# Patient Record
Sex: FEMALE | Race: WHITE | Employment: FULL TIME | URBAN - METROPOLITAN AREA
[De-identification: names, ages, dates, MRNs, and addresses within clinical notes are randomized per-mention and may not be internally consistent; named-entity substitution may affect disease eponyms.]

---

## 2017-07-10 ENCOUNTER — HOSPITAL ENCOUNTER (OUTPATIENT)
Dept: MAMMOGRAPHY | Age: 42
Discharge: HOME | End: 2017-07-10
Attending: FAMILY MEDICINE | Admitting: FAMILY MEDICINE

## 2017-07-10 DIAGNOSIS — Z12.31 VISIT FOR SCREENING MAMMOGRAM: ICD-10-CM

## 2018-08-02 ENCOUNTER — HOSPITAL ENCOUNTER (OUTPATIENT)
Dept: MAMMOGRAPHY | Age: 43
Discharge: HOME | End: 2018-08-02
Payer: COMMERCIAL

## 2018-08-02 ENCOUNTER — HOSPITAL ENCOUNTER (OUTPATIENT)
Dept: ULTRASOUND IMAGING | Age: 43
Discharge: HOME | End: 2018-08-02
Payer: COMMERCIAL

## 2018-08-02 DIAGNOSIS — R92.8 ABNORMAL MAMMOGRAM: ICD-10-CM

## 2018-08-02 DIAGNOSIS — Z12.31 VISIT FOR SCREENING MAMMOGRAM: ICD-10-CM

## 2018-08-02 PROCEDURE — 76642 ULTRASOUND BREAST LIMITED: CPT

## 2018-08-02 PROCEDURE — 77063 BREAST TOMOSYNTHESIS BI: CPT

## 2019-02-22 ENCOUNTER — HOSPITAL ENCOUNTER (OUTPATIENT)
Dept: MAMMOGRAPHY | Age: 44
Discharge: HOME | End: 2019-02-22
Payer: COMMERCIAL

## 2019-02-22 DIAGNOSIS — R92.8 ABNORMAL MAMMOGRAM: ICD-10-CM

## 2019-02-22 PROCEDURE — G0279 TOMOSYNTHESIS, MAMMO: HCPCS

## 2019-08-13 ENCOUNTER — HOSPITAL ENCOUNTER (OUTPATIENT)
Dept: MAMMOGRAPHY | Age: 44
Discharge: HOME | End: 2019-08-13
Payer: COMMERCIAL

## 2019-08-13 DIAGNOSIS — Z12.31 VISIT FOR SCREENING MAMMOGRAM: ICD-10-CM

## 2019-08-13 PROCEDURE — 77063 BREAST TOMOSYNTHESIS BI: CPT

## 2020-10-09 ENCOUNTER — HOSPITAL ENCOUNTER (OUTPATIENT)
Dept: MAMMOGRAPHY | Age: 45
Discharge: HOME | End: 2020-10-09
Payer: COMMERCIAL

## 2020-10-09 PROCEDURE — 77063 BREAST TOMOSYNTHESIS BI: CPT

## 2021-10-15 ENCOUNTER — HOSPITAL ENCOUNTER (OUTPATIENT)
Dept: MAMMOGRAPHY | Age: 46
Discharge: HOME | End: 2021-10-15
Payer: COMMERCIAL

## 2021-10-15 VITALS — WEIGHT: 155 LBS | BODY MASS INDEX: 24.91 KG/M2 | HEIGHT: 66 IN

## 2021-10-15 DIAGNOSIS — Z12.31 VISIT FOR SCREENING MAMMOGRAM: ICD-10-CM

## 2021-10-15 PROCEDURE — 77063 BREAST TOMOSYNTHESIS BI: CPT

## 2021-10-27 ENCOUNTER — HOSPITAL ENCOUNTER (OUTPATIENT)
Dept: MAMMOGRAPHY | Age: 46
Discharge: HOME | End: 2021-10-27
Payer: COMMERCIAL

## 2021-10-27 ENCOUNTER — HOSPITAL ENCOUNTER (OUTPATIENT)
Dept: ULTRASOUND IMAGING | Age: 46
Discharge: HOME | End: 2021-10-27
Payer: COMMERCIAL

## 2021-10-27 VITALS — HEIGHT: 66 IN | BODY MASS INDEX: 24.91 KG/M2 | WEIGHT: 155 LBS

## 2021-10-27 DIAGNOSIS — R92.8 ABNORMAL MAMMOGRAM: ICD-10-CM

## 2021-10-27 DIAGNOSIS — R92.8 FOLLOW-UP EXAMINATION OF ABNORMAL MAMMOGRAM: ICD-10-CM

## 2021-10-27 PROCEDURE — 76642 ULTRASOUND BREAST LIMITED: CPT

## 2021-10-27 PROCEDURE — 77065 DX MAMMO INCL CAD UNI: CPT

## 2022-02-07 DIAGNOSIS — R92.8 ABNORMAL MAMMOGRAM: Primary | ICD-10-CM

## 2022-03-22 ENCOUNTER — TELEPHONE (OUTPATIENT)
Dept: PRIMARY CARE CLINIC | Age: 47
End: 2022-03-22

## 2022-03-22 NOTE — TELEPHONE ENCOUNTER
----- Message from Lana Ornelas sent at 3/21/2022  4:19 PM EDT -----  Subject: Message to Provider    QUESTIONS  Information for Provider? Patient would like to reestablish care with the   doctor. Patient would like to have yearly exam if so. Patient was last   seen 4/16/2012 please call to advise.  ---------------------------------------------------------------------------  --------------  CALL BACK INFO  What is the best way for the office to contact you? OK to leave message on   voicemail  Preferred Call Back Phone Number? [phone]  ---------------------------------------------------------------------------  --------------  SCRIPT ANSWERS  Relationship to Patient?  Self

## 2022-03-23 ASSESSMENT — ENCOUNTER SYMPTOMS
NAUSEA: 0
ABDOMINAL PAIN: 0
COUGH: 0
VOMITING: 0
CONSTIPATION: 0
WHEEZING: 0
CHEST TIGHTNESS: 0
DIARRHEA: 0
SHORTNESS OF BREATH: 0

## 2022-03-24 ENCOUNTER — OFFICE VISIT (OUTPATIENT)
Dept: FAMILY MEDICINE CLINIC | Age: 47
End: 2022-03-24
Payer: COMMERCIAL

## 2022-03-24 VITALS
OXYGEN SATURATION: 99 % | WEIGHT: 162 LBS | TEMPERATURE: 98 F | SYSTOLIC BLOOD PRESSURE: 144 MMHG | BODY MASS INDEX: 26.15 KG/M2 | DIASTOLIC BLOOD PRESSURE: 90 MMHG | HEART RATE: 90 BPM

## 2022-03-24 DIAGNOSIS — Z11.59 NEED FOR HEPATITIS C SCREENING TEST: ICD-10-CM

## 2022-03-24 DIAGNOSIS — Z11.4 SCREENING FOR HIV (HUMAN IMMUNODEFICIENCY VIRUS): ICD-10-CM

## 2022-03-24 DIAGNOSIS — Z13.220 SCREENING FOR CHOLESTEROL LEVEL: ICD-10-CM

## 2022-03-24 DIAGNOSIS — R23.3 PETECHIAL RASH: ICD-10-CM

## 2022-03-24 DIAGNOSIS — Z76.89 ENCOUNTER TO ESTABLISH CARE: ICD-10-CM

## 2022-03-24 DIAGNOSIS — R23.3 BRUISES EASILY: ICD-10-CM

## 2022-03-24 DIAGNOSIS — Z13.29 SCREENING FOR THYROID DISORDER: ICD-10-CM

## 2022-03-24 DIAGNOSIS — R23.3 PETECHIAL RASH: Primary | ICD-10-CM

## 2022-03-24 LAB
A/G RATIO: 1.9 (ref 1.1–2.2)
ALBUMIN SERPL-MCNC: 4.6 G/DL (ref 3.4–5)
ALP BLD-CCNC: 121 U/L (ref 40–129)
ALT SERPL-CCNC: 37 U/L (ref 10–40)
ANION GAP SERPL CALCULATED.3IONS-SCNC: 11 MMOL/L (ref 3–16)
AST SERPL-CCNC: 33 U/L (ref 15–37)
BILIRUB SERPL-MCNC: 0.6 MG/DL (ref 0–1)
BUN BLDV-MCNC: 9 MG/DL (ref 7–20)
C-REACTIVE PROTEIN: 4.9 MG/L (ref 0–5.1)
CALCIUM SERPL-MCNC: 9.6 MG/DL (ref 8.3–10.6)
CHLORIDE BLD-SCNC: 102 MMOL/L (ref 99–110)
CHOLESTEROL, TOTAL: 231 MG/DL (ref 0–199)
CO2: 26 MMOL/L (ref 21–32)
CREAT SERPL-MCNC: 0.6 MG/DL (ref 0.6–1.1)
GFR AFRICAN AMERICAN: >60
GFR NON-AFRICAN AMERICAN: >60
GLUCOSE BLD-MCNC: 105 MG/DL (ref 70–99)
HDLC SERPL-MCNC: 94 MG/DL (ref 40–60)
HEPATITIS C ANTIBODY INTERPRETATION: NORMAL
INR BLD: 0.96 (ref 0.88–1.12)
LDL CHOLESTEROL CALCULATED: 125 MG/DL
POTASSIUM SERPL-SCNC: 3.6 MMOL/L (ref 3.5–5.1)
PROTHROMBIN TIME: 10.8 SEC (ref 9.9–12.7)
REASON FOR REJECTION: NORMAL
REJECTED TEST: NORMAL
RHEUMATOID FACTOR: <10 IU/ML
SODIUM BLD-SCNC: 139 MMOL/L (ref 136–145)
TOTAL PROTEIN: 7 G/DL (ref 6.4–8.2)
TRIGL SERPL-MCNC: 59 MG/DL (ref 0–150)
TSH REFLEX: 0.9 UIU/ML (ref 0.27–4.2)
VLDLC SERPL CALC-MCNC: 12 MG/DL

## 2022-03-24 PROCEDURE — G8419 CALC BMI OUT NRM PARAM NOF/U: HCPCS | Performed by: CLINICAL NURSE SPECIALIST

## 2022-03-24 PROCEDURE — G8427 DOCREV CUR MEDS BY ELIG CLIN: HCPCS | Performed by: CLINICAL NURSE SPECIALIST

## 2022-03-24 PROCEDURE — G8484 FLU IMMUNIZE NO ADMIN: HCPCS | Performed by: CLINICAL NURSE SPECIALIST

## 2022-03-24 PROCEDURE — 1036F TOBACCO NON-USER: CPT | Performed by: CLINICAL NURSE SPECIALIST

## 2022-03-24 PROCEDURE — 99203 OFFICE O/P NEW LOW 30 MIN: CPT | Performed by: CLINICAL NURSE SPECIALIST

## 2022-03-24 RX ORDER — FOLIC ACID 0.8 MG
TABLET ORAL
COMMUNITY

## 2022-03-24 RX ORDER — ASCORBIC ACID 500 MG
500 TABLET ORAL DAILY
COMMUNITY

## 2022-03-24 RX ORDER — CYANOCOBALAMIN (VITAMIN B-12) 1000 MCG
TABLET ORAL
COMMUNITY

## 2022-03-24 RX ORDER — BIOTIN 5 MG
TABLET ORAL
COMMUNITY

## 2022-03-24 RX ORDER — NORETHINDRONE ACETATE AND ETHINYL ESTRADIOL, ETHINYL ESTRADIOL AND FERROUS FUMARATE 1MG-10(24)
KIT ORAL
COMMUNITY
Start: 2022-02-26

## 2022-03-24 ASSESSMENT — PATIENT HEALTH QUESTIONNAIRE - PHQ9
1. LITTLE INTEREST OR PLEASURE IN DOING THINGS: 0
SUM OF ALL RESPONSES TO PHQ QUESTIONS 1-9: 0
2. FEELING DOWN, DEPRESSED OR HOPELESS: 0
SUM OF ALL RESPONSES TO PHQ9 QUESTIONS 1 & 2: 0
SUM OF ALL RESPONSES TO PHQ QUESTIONS 1-9: 0

## 2022-03-24 ASSESSMENT — ENCOUNTER SYMPTOMS
VISUAL CHANGE: 0
SORE THROAT: 0
CHANGE IN BOWEL HABIT: 0
SWOLLEN GLANDS: 0

## 2022-03-24 NOTE — PROGRESS NOTES
SUBJECTIVE:    Patient ID:  Ric North is a 50 y.o. female      Patient is here to establish care and for rash and bruising easily for about a month. States she has always bruised easily, but it has gotten worse over the last month. States she had COVID-19 mid January. She has not been vaccinated for COVID-19. She has been taking the listed vitamins for over a year, but added the vitamin K about 2 weeks ago. Patient's allergies, medication, medical, surgical, family and social history were reviewed and updated accordingly. Other  This is a new problem. The current episode started 1 to 4 weeks ago. The problem occurs intermittently. The problem has been waxing and waning. Associated symptoms include a rash. Pertinent negatives include no abdominal pain, anorexia, arthralgias, change in bowel habit, chest pain, chills, congestion, coughing, diaphoresis, fatigue, fever, headaches, joint swelling, myalgias, nausea, numbness, sore throat, swollen glands, urinary symptoms, vertigo, visual change, vomiting or weakness. Nothing aggravates the symptoms. She has tried nothing for the symptoms. Current Outpatient Medications on File Prior to Visit   Medication Sig Dispense Refill    Cyanocobalamin (B-12) 1000 MCG TABS Take by mouth      QUERCETIN PO Take 750 mg by mouth      Magnesium 500 MG CAPS Take by mouth      vitamin C (ASCORBIC ACID) 500 MG tablet Take 500 mg by mouth daily      Vitamin D-Vitamin K (K2 PLUS D3 PO) Take 5,000 Units by mouth      zinc 50 MG CAPS Take by mouth every other day      VITAMIN K PO Take 550 mcg by mouth daily      selenium 50 MCG tablet Take 50 mcg by mouth daily      Krill Oil 1000 MG CAPS Take by mouth      LO LOESTRIN FE 1 MG-10 MCG / 10 MCG tablet TAKE 1 TABLET BY MOUTH DAILY. TAKE PILLS IN THE ORDER DIRECTED ON BLISTER PACK. (Patient not taking: Reported on 3/24/2022)       No current facility-administered medications on file prior to visit. Past Medical History:   Diagnosis Date    Pelvic mass      Past Surgical History:   Procedure Laterality Date    OTHER SURGICAL HISTORY  04/23/2012    SINGLE SITE LAPAROSCOPIC MYOMECTOMY      UTERINE FIBROID SURGERY  2012     Family History   Problem Relation Age of Onset    Cancer Mother         renal cell ca, (d) in 80 47     Diabetes Father     Heart Failure Father     Hypertension Father     High Cholesterol Father     Prostate Cancer Father     Kidney Disease Father     Heart Attack Maternal Grandmother     Heart Disease Maternal Grandfather     Heart Disease Paternal Grandmother     Diabetes Paternal Grandmother     Heart Attack Paternal Grandfather     Heart Disease Paternal Grandfather      Social History     Socioeconomic History    Marital status:      Spouse name: Not on file    Number of children: Not on file    Years of education: Not on file    Highest education level: Not on file   Occupational History    Not on file   Tobacco Use    Smoking status: Former Smoker     Types: Cigarettes    Smokeless tobacco: Never Used    Tobacco comment: Pt smoked in college socially   Vaping Use    Vaping Use: Never used   Substance and Sexual Activity    Alcohol use: Yes     Alcohol/week: 1.0 standard drink     Types: 1 Glasses of wine per week    Drug use: Never    Sexual activity: Not on file   Other Topics Concern    Not on file   Social History Narrative    ** Merged History Encounter **          Social Determinants of Health     Financial Resource Strain:     Difficulty of Paying Living Expenses: Not on file   Food Insecurity:     Worried About Running Out of Food in the Last Year: Not on file    Asuncion of Food in the Last Year: Not on file   Transportation Needs:     Lack of Transportation (Medical): Not on file    Lack of Transportation (Non-Medical):  Not on file   Physical Activity:     Days of Exercise per Week: Not on file    Minutes of Exercise per Session: Not on file   Stress:     Feeling of Stress : Not on file   Social Connections:     Frequency of Communication with Friends and Family: Not on file    Frequency of Social Gatherings with Friends and Family: Not on file    Attends [employer] Services: Not on file    Active Member of Clubs or Organizations: Not on file    Attends Club or Organization Meetings: Not on file    Marital Status: Not on file   Intimate Partner Violence:     Fear of Current or Ex-Partner: Not on file    Emotionally Abused: Not on file    Physically Abused: Not on file    Sexually Abused: Not on file   Housing Stability:     Unable to Pay for Housing in the Last Year: Not on file    Number of [place] in the Last Year: Not on file    Unstable Housing in the Last Year: Not on file       Review of Systems   Constitutional: Negative for chills, diaphoresis, fatigue, fever and unexpected weight change. HENT: Positive for mouth sores. Negative for congestion and sore throat. Eyes: Negative for visual disturbance. Respiratory: Negative for cough, chest tightness, shortness of breath and wheezing. Cardiovascular: Negative for chest pain, palpitations and leg swelling. Gastrointestinal: Negative for abdominal pain, anorexia, change in bowel habit, constipation, diarrhea, nausea and vomiting. Endocrine: Negative for polydipsia and polyuria. Genitourinary: Negative for hematuria. Musculoskeletal: Negative for arthralgias, joint swelling and myalgias. Skin: Positive for rash. Neurological: Negative for vertigo, weakness, numbness and headaches. Hematological: Bruises/bleeds easily. Psychiatric/Behavioral: Nervous/anxious: slight. OBJECTIVE:    Physical Exam  Vitals and nursing note reviewed. Constitutional:       General: She is not in acute distress. Appearance: Normal appearance. She is well-developed. She is not ill-appearing. HENT:      Head: Normocephalic and atraumatic.       Right Ear: External Pt voiced understanding. Call office if experience side effects from medications. Please note that some or all of this record was generated using voice recognition software. If there are any questions about the content of this document, please contact the author as some errors in transcription may have occurred.

## 2022-03-25 ENCOUNTER — TELEPHONE (OUTPATIENT)
Dept: FAMILY MEDICINE CLINIC | Age: 47
End: 2022-03-25

## 2022-03-25 DIAGNOSIS — R23.3 PETECHIAL RASH: Primary | ICD-10-CM

## 2022-03-25 DIAGNOSIS — R23.3 BRUISES EASILY: ICD-10-CM

## 2022-03-25 DIAGNOSIS — R73.01 ELEVATED FASTING GLUCOSE: Primary | ICD-10-CM

## 2022-03-25 DIAGNOSIS — R23.3 PETECHIAL RASH: ICD-10-CM

## 2022-03-25 LAB
ANTI-NUCLEAR ANTIBODY (ANA): NEGATIVE
BASOPHILS ABSOLUTE: 0.1 K/UL (ref 0–0.2)
BASOPHILS RELATIVE PERCENT: 0.7 %
EOSINOPHILS ABSOLUTE: 0.2 K/UL (ref 0–0.6)
EOSINOPHILS RELATIVE PERCENT: 2 %
HCT VFR BLD CALC: 37.4 % (ref 36–48)
HEMATOLOGY PATH CONSULT: YES
HEMOGLOBIN: 12.7 G/DL (ref 12–16)
HIV AG/AB: NORMAL
HIV ANTIGEN: NORMAL
HIV-1 ANTIBODY: NORMAL
HIV-2 AB: NORMAL
LYMPHOCYTES ABSOLUTE: 1.4 K/UL (ref 1–5.1)
LYMPHOCYTES RELATIVE PERCENT: 15.9 %
MCH RBC QN AUTO: 31.3 PG (ref 26–34)
MCHC RBC AUTO-ENTMCNC: 34.1 G/DL (ref 31–36)
MCV RBC AUTO: 91.9 FL (ref 80–100)
MONOCYTES ABSOLUTE: 0.6 K/UL (ref 0–1.3)
MONOCYTES RELATIVE PERCENT: 6.5 %
NEUTROPHILS ABSOLUTE: 6.6 K/UL (ref 1.7–7.7)
NEUTROPHILS RELATIVE PERCENT: 74.9 %
PDW BLD-RTO: 14.1 % (ref 12.4–15.4)
PLATELET # BLD: 3 K/UL (ref 135–450)
PLATELET SLIDE REVIEW: ABNORMAL
PMV BLD AUTO: 10 FL (ref 5–10.5)
RBC # BLD: 4.06 M/UL (ref 4–5.2)
SEDIMENTATION RATE, ERYTHROCYTE: 16 MM/HR (ref 0–20)
SLIDE REVIEW: ABNORMAL
WBC # BLD: 8.8 K/UL (ref 4–11)

## 2022-03-25 NOTE — TELEPHONE ENCOUNTER
Mag called from [facility] lab and stated unable to run pt CBC & ESR tube was possibly contaminated. Needs lab reordered.      Lab: [phone]    Best contact for pt: [phone]

## 2022-03-25 NOTE — TELEPHONE ENCOUNTER
Patient notified to have CBC and ESR redrawn. Patient expresses understanding and will go back to the lab.

## 2022-03-26 ENCOUNTER — HOSPITAL ENCOUNTER (INPATIENT)
Age: 47
LOS: 2 days | Discharge: HOME | DRG: 813 | End: 2022-03-28
Attending: STUDENT IN AN ORGANIZED HEALTH CARE EDUCATION/TRAINING PROGRAM | Admitting: FAMILY MEDICINE
Payer: COMMERCIAL

## 2022-03-26 DIAGNOSIS — R73.01 ELEVATED FASTING GLUCOSE: ICD-10-CM

## 2022-03-26 DIAGNOSIS — D69.3 IDIOPATHIC THROMBOCYTOPENIA (HCC): Primary | ICD-10-CM

## 2022-03-26 PROBLEM — D69.59 THROMBOCYTOPENIA DUE TO COVID-19 VIRUS: Status: ACTIVE | Noted: 2022-03-26

## 2022-03-26 PROBLEM — U07.1 THROMBOCYTOPENIA DUE TO COVID-19 VIRUS: Status: ACTIVE | Noted: 2022-03-26

## 2022-03-26 LAB
ANION GAP SERPL CALCULATED.3IONS-SCNC: 13 MMOL/L (ref 3–16)
APTT: 37.4 SEC (ref 26.2–38.6)
BASOPHILS ABSOLUTE: 0.1 K/UL (ref 0–0.2)
BASOPHILS RELATIVE PERCENT: 1 %
BLOOD SMEAR REVIEW: NORMAL
BUN BLDV-MCNC: 10 MG/DL (ref 7–20)
CALCIUM SERPL-MCNC: 10.2 MG/DL (ref 8.3–10.6)
CHLORIDE BLD-SCNC: 99 MMOL/L (ref 99–110)
CO2: 25 MMOL/L (ref 21–32)
CREAT SERPL-MCNC: 0.7 MG/DL (ref 0.6–1.1)
D DIMER: 228 NG/ML DDU (ref 0–229)
EOSINOPHILS ABSOLUTE: 0.1 K/UL (ref 0–0.6)
EOSINOPHILS RELATIVE PERCENT: 1.8 %
ESTIMATED AVERAGE GLUCOSE: 88.2 MG/DL
FIBRINOGEN: 374 MG/DL (ref 200–397)
GFR AFRICAN AMERICAN: >60
GFR NON-AFRICAN AMERICAN: >60
GLUCOSE BLD-MCNC: 92 MG/DL (ref 70–99)
HBA1C MFR BLD: 4.7 %
HCG QUALITATIVE: NEGATIVE
HCT VFR BLD CALC: 39 % (ref 36–48)
HEMATOLOGY PATH CONSULT: YES
HEMOGLOBIN: 13.2 G/DL (ref 12–16)
INR BLD: 1.02 (ref 0.88–1.12)
LACTATE DEHYDROGENASE: 214 U/L (ref 100–190)
LYMPHOCYTES ABSOLUTE: 1.6 K/UL (ref 1–5.1)
LYMPHOCYTES RELATIVE PERCENT: 20.2 %
MCH RBC QN AUTO: 30.8 PG (ref 26–34)
MCHC RBC AUTO-ENTMCNC: 33.8 G/DL (ref 31–36)
MCV RBC AUTO: 91.1 FL (ref 80–100)
MONOCYTES ABSOLUTE: 0.4 K/UL (ref 0–1.3)
MONOCYTES RELATIVE PERCENT: 5.1 %
NEUTROPHILS ABSOLUTE: 5.6 K/UL (ref 1.7–7.7)
NEUTROPHILS RELATIVE PERCENT: 71.9 %
PDW BLD-RTO: 13.6 % (ref 12.4–15.4)
PLATELET # BLD: 3 K/UL (ref 135–450)
PLATELET SLIDE REVIEW: ABNORMAL
PMV BLD AUTO: 9 FL (ref 5–10.5)
POTASSIUM REFLEX MAGNESIUM: 3.8 MMOL/L (ref 3.5–5.1)
PROTHROMBIN TIME: 11.5 SEC (ref 9.9–12.7)
RBC # BLD: 4.28 M/UL (ref 4–5.2)
RBC # BLD: NORMAL 10*6/UL
SLIDE REVIEW: ABNORMAL
SODIUM BLD-SCNC: 137 MMOL/L (ref 136–145)
WBC # BLD: 7.8 K/UL (ref 4–11)

## 2022-03-26 PROCEDURE — 83615 LACTATE (LD) (LDH) ENZYME: CPT

## 2022-03-26 PROCEDURE — 85384 FIBRINOGEN ACTIVITY: CPT

## 2022-03-26 PROCEDURE — 85730 THROMBOPLASTIN TIME PARTIAL: CPT

## 2022-03-26 PROCEDURE — 6370000000 HC RX 637 (ALT 250 FOR IP): Performed by: STUDENT IN AN ORGANIZED HEALTH CARE EDUCATION/TRAINING PROGRAM

## 2022-03-26 PROCEDURE — 99284 EMERGENCY DEPT VISIT MOD MDM: CPT

## 2022-03-26 PROCEDURE — 80048 BASIC METABOLIC PNL TOTAL CA: CPT

## 2022-03-26 PROCEDURE — 2580000003 HC RX 258: Performed by: FAMILY MEDICINE

## 2022-03-26 PROCEDURE — XW133E7 TRANSFUSION OF HYPERIMMUNE GLOBULIN INTO PERIPHERAL VEIN, PERCUTANEOUS APPROACH, NEW TECHNOLOGY GROUP 7: ICD-10-PCS | Performed by: FAMILY MEDICINE

## 2022-03-26 PROCEDURE — 85025 COMPLETE CBC W/AUTO DIFF WBC: CPT

## 2022-03-26 PROCEDURE — 96374 THER/PROPH/DIAG INJ IV PUSH: CPT

## 2022-03-26 PROCEDURE — 84703 CHORIONIC GONADOTROPIN ASSAY: CPT

## 2022-03-26 PROCEDURE — 6360000002 HC RX W HCPCS: Performed by: STUDENT IN AN ORGANIZED HEALTH CARE EDUCATION/TRAINING PROGRAM

## 2022-03-26 PROCEDURE — 85379 FIBRIN DEGRADATION QUANT: CPT

## 2022-03-26 PROCEDURE — 83010 ASSAY OF HAPTOGLOBIN QUANT: CPT

## 2022-03-26 PROCEDURE — 2580000003 HC RX 258: Performed by: STUDENT IN AN ORGANIZED HEALTH CARE EDUCATION/TRAINING PROGRAM

## 2022-03-26 PROCEDURE — 85610 PROTHROMBIN TIME: CPT

## 2022-03-26 PROCEDURE — 1200000000 HC SEMI PRIVATE

## 2022-03-26 PROCEDURE — 36415 COLL VENOUS BLD VENIPUNCTURE: CPT

## 2022-03-26 PROCEDURE — 6360000002 HC RX W HCPCS: Performed by: FAMILY MEDICINE

## 2022-03-26 RX ORDER — HYDRALAZINE HYDROCHLORIDE 20 MG/ML
10 INJECTION INTRAMUSCULAR; INTRAVENOUS ONCE
Status: COMPLETED | OUTPATIENT
Start: 2022-03-26 | End: 2022-03-26

## 2022-03-26 RX ORDER — HYDRALAZINE HYDROCHLORIDE 20 MG/ML
10 INJECTION INTRAMUSCULAR; INTRAVENOUS EVERY 4 HOURS PRN
Status: DISCONTINUED | OUTPATIENT
Start: 2022-03-26 | End: 2022-03-28 | Stop reason: HOSPADM

## 2022-03-26 RX ORDER — ONDANSETRON 4 MG/1
4 TABLET, ORALLY DISINTEGRATING ORAL EVERY 8 HOURS PRN
Status: DISCONTINUED | OUTPATIENT
Start: 2022-03-26 | End: 2022-03-28 | Stop reason: HOSPADM

## 2022-03-26 RX ORDER — POLYETHYLENE GLYCOL 3350 17 G/17G
17 POWDER, FOR SOLUTION ORAL DAILY PRN
Status: DISCONTINUED | OUTPATIENT
Start: 2022-03-26 | End: 2022-03-28 | Stop reason: HOSPADM

## 2022-03-26 RX ORDER — ACETAMINOPHEN 325 MG/1
650 TABLET ORAL ONCE
Status: COMPLETED | OUTPATIENT
Start: 2022-03-26 | End: 2022-03-26

## 2022-03-26 RX ORDER — ACETAMINOPHEN 325 MG/1
650 TABLET ORAL EVERY 6 HOURS PRN
Status: DISCONTINUED | OUTPATIENT
Start: 2022-03-26 | End: 2022-03-28 | Stop reason: HOSPADM

## 2022-03-26 RX ORDER — DIPHENHYDRAMINE HYDROCHLORIDE 50 MG/ML
50 INJECTION INTRAMUSCULAR; INTRAVENOUS ONCE
Status: COMPLETED | OUTPATIENT
Start: 2022-03-26 | End: 2022-03-26

## 2022-03-26 RX ORDER — SODIUM CHLORIDE 9 MG/ML
25 INJECTION, SOLUTION INTRAVENOUS PRN
Status: DISCONTINUED | OUTPATIENT
Start: 2022-03-26 | End: 2022-03-28 | Stop reason: HOSPADM

## 2022-03-26 RX ORDER — ACETAMINOPHEN 650 MG/1
650 SUPPOSITORY RECTAL EVERY 6 HOURS PRN
Status: DISCONTINUED | OUTPATIENT
Start: 2022-03-26 | End: 2022-03-28 | Stop reason: HOSPADM

## 2022-03-26 RX ORDER — ONDANSETRON 2 MG/ML
4 INJECTION INTRAMUSCULAR; INTRAVENOUS EVERY 6 HOURS PRN
Status: DISCONTINUED | OUTPATIENT
Start: 2022-03-26 | End: 2022-03-28 | Stop reason: HOSPADM

## 2022-03-26 RX ORDER — SODIUM CHLORIDE 0.9 % (FLUSH) 0.9 %
5-40 SYRINGE (ML) INJECTION EVERY 12 HOURS SCHEDULED
Status: DISCONTINUED | OUTPATIENT
Start: 2022-03-26 | End: 2022-03-28 | Stop reason: HOSPADM

## 2022-03-26 RX ORDER — SODIUM CHLORIDE 0.9 % (FLUSH) 0.9 %
5-40 SYRINGE (ML) INJECTION PRN
Status: DISCONTINUED | OUTPATIENT
Start: 2022-03-26 | End: 2022-03-28 | Stop reason: HOSPADM

## 2022-03-26 RX ORDER — DEXAMETHASONE SODIUM PHOSPHATE 10 MG/ML
8 INJECTION, SOLUTION INTRAMUSCULAR; INTRAVENOUS 2 TIMES DAILY
Status: DISCONTINUED | OUTPATIENT
Start: 2022-03-27 | End: 2022-03-26 | Stop reason: ALTCHOICE

## 2022-03-26 RX ADMIN — IMMUNE GLOBULIN (HUMAN) 70 G: 10 INJECTION INTRAVENOUS; SUBCUTANEOUS at 20:33

## 2022-03-26 RX ADMIN — SODIUM CHLORIDE, PRESERVATIVE FREE 10 ML: 5 INJECTION INTRAVENOUS at 20:05

## 2022-03-26 RX ADMIN — ACETAMINOPHEN 650 MG: 325 TABLET ORAL at 16:36

## 2022-03-26 RX ADMIN — DIPHENHYDRAMINE HYDROCHLORIDE 50 MG: 50 INJECTION, SOLUTION INTRAMUSCULAR; INTRAVENOUS at 16:40

## 2022-03-26 RX ADMIN — DEXAMETHASONE SODIUM PHOSPHATE 40 MG: 4 INJECTION, SOLUTION INTRAMUSCULAR; INTRAVENOUS at 18:15

## 2022-03-26 RX ADMIN — HYDRALAZINE HYDROCHLORIDE 10 MG: 20 INJECTION, SOLUTION INTRAMUSCULAR; INTRAVENOUS at 20:05

## 2022-03-26 ASSESSMENT — ENCOUNTER SYMPTOMS
ABDOMINAL PAIN: 0
VOMITING: 0

## 2022-03-26 ASSESSMENT — PAIN SCALES - GENERAL
PAINLEVEL_OUTOF10: 0

## 2022-03-26 NOTE — PROGRESS NOTES
4 Eyes Skin Assessment     NAME:  Altagracia Jane  YOB: 1975  MEDICAL RECORD NUMBER:  [id number]    The patient is being assess for  Admission    I agree that 2 RN's have performed a thorough Head to Toe Skin Assessment on the patient. ALL assessment sites listed below have been assessed. Areas assessed by both nurses:    Head, Face, Ears, Shoulders, Back, Chest, Arms, Elbows, Hands, Sacrum. Buttock, Coccyx, Ischium and Legs. Feet and Heels        Does the Patient have a Wound?  No noted wound(s)       Cristofer Prevention initiated:  Yes   Wound Care Orders initiated:  No    Pressure Injury (Stage 3,4, Unstageable, DTI, NWPT, and Complex wounds) if present place consult order under [de-identified] No    New and Established Ostomies if present place consult order under : No      Nurse 1 eSignature: Electronically signed by Jd Solomon RN on 3/26/22 at 7:30 PM EDT    **SHARE this note so that the co-signing nurse is able to place an eSignature**    Nurse 2 eSignature: Electronically signed by Kayla Alcocer RN on 3/26/22 at 7:30 PM EDT

## 2022-03-26 NOTE — ED PROVIDER NOTES
Diagnosis Date    Pelvic mass          SURGICAL HISTORY       Past Surgical History:   Procedure Laterality Date    OTHER SURGICAL HISTORY  04/23/2012    SINGLE SITE LAPAROSCOPIC MYOMECTOMY      UTERINE FIBROID SURGERY  2012         CURRENT MEDICATIONS       Previous Medications    CYANOCOBALAMIN (B-12) 1000 MCG TABS    Take by mouth    KRILL OIL 1000 MG CAPS    Take by mouth    LO LOESTRIN FE 1 MG-10 MCG / 10 MCG TABLET    TAKE 1 TABLET BY MOUTH DAILY. TAKE PILLS IN THE ORDER DIRECTED ON BLISTER PACK. MAGNESIUM 500 MG CAPS    Take by mouth    QUERCETIN PO    Take 750 mg by mouth    SELENIUM 50 MCG TABLET    Take 50 mcg by mouth daily    VITAMIN C (ASCORBIC ACID) 500 MG TABLET    Take 500 mg by mouth daily    VITAMIN D-VITAMIN K (K2 PLUS D3 PO)    Take 5,000 Units by mouth    VITAMIN K PO    Take 550 mcg by mouth daily    ZINC 50 MG CAPS    Take by mouth every other day       ALLERGIES     Patient has no known allergies.     FAMILY HISTORY       Family History   Problem Relation Age of Onset    Cancer Mother         renal cell ca, (d) in 80 54     Diabetes Father     Heart Failure Father     Hypertension Father     High Cholesterol Father     Prostate Cancer Father     Kidney Disease Father     Heart Attack Maternal Grandmother     Heart Disease Maternal Grandfather     Heart Disease Paternal Grandmother     Diabetes Paternal Grandmother     Heart Attack Paternal Grandfather     Heart Disease Paternal Grandfather           SOCIAL HISTORY       Social History     Socioeconomic History    Marital status:      Spouse name: None    Number of children: None    Years of education: None    Highest education level: None   Occupational History    None   Tobacco Use    Smoking status: Former Smoker     Types: Cigarettes    Smokeless tobacco: Never Used    Tobacco comment: Pt smoked in college socially   Vaping Use    Vaping Use: Never used   Substance and Sexual Activity    Alcohol use: Yes     Alcohol/week: 1.0 standard drink     Types: 1 Glasses of wine per week    Drug use: Never    Sexual activity: None   Other Topics Concern    None   Social History Narrative    ** Merged History Encounter **          Social Determinants of Health     Financial Resource Strain:     Difficulty of Paying Living Expenses: Not on file   Food Insecurity:     Worried About Running Out of Food in the Last Year: Not on file    Asuncion of Food in the Last Year: Not on file   Transportation Needs:     Lack of Transportation (Medical): Not on file    Lack of Transportation (Non-Medical): Not on file   Physical Activity:     Days of Exercise per Week: Not on file    Minutes of Exercise per Session: Not on file   Stress:     Feeling of Stress : Not on file   Social Connections:     Frequency of Communication with Friends and Family: Not on file    Frequency of Social Gatherings with Friends and Family: Not on file    Attends Catholic Services: Not on file    Active Member of [address] [place] [address] or Organizations: Not on file    Attends Club or Organization Meetings: Not on file    Marital Status: Not on file   Intimate Partner Violence:     Fear of Current or Ex-Partner: Not on file    Emotionally Abused: Not on file    Physically Abused: Not on file    Sexually Abused: Not on file   Housing Stability:     Unable to Pay for Housing in the Last Year: Not on file    Number of [place] in the Last Year: Not on file    Unstable Housing in the Last Year: Not on file       SCREENINGS                        PHYSICAL EXAM    (up to 7 for level 4, 8 or more for level 5)     ED Triage Vitals [03/26/22 1348]   BP Temp Temp Source Pulse Resp SpO2 Height Weight   (!) 175/83 98.7 °F (37.1 °C) Oral 88 16 99 % 5' 6\" (1.676 m) 158 lb 4.8 oz (71.8 kg)       Physical Exam  Constitutional:       Appearance: Normal appearance. HENT:      Head: Normocephalic and atraumatic.    Eyes:      Conjunctiva/sclera: Conjunctivae normal. Cardiovascular:      Rate and Rhythm: Normal rate and regular rhythm. Pulses: Normal pulses. Heart sounds: Normal heart sounds. Pulmonary:      Effort: Pulmonary effort is normal.      Breath sounds: Normal breath sounds. Abdominal:      General: Abdomen is flat. There is no distension. Palpations: Abdomen is soft. There is no mass. Tenderness: There is no abdominal tenderness. Musculoskeletal:      Cervical back: Normal range of motion. No rigidity. Skin:     General: Skin is warm and dry. Capillary Refill: Capillary refill takes less than 2 seconds. Comments: Nonblanching petechial rash noted to bilateral calfs. 3 cm area of petechial rash noted to the right upper extremity. Multiple bruises noted throughout extremities. Lesions noted on tongue   Neurological:      Mental Status: She is alert and oriented to person, place, and time.    Psychiatric:         Mood and Affect: Mood normal.         Behavior: Behavior normal.         DIAGNOSTIC RESULTS     RADIOLOGY:   Non-plain film images such as CT, Ultrasound and MRI are read by the radiologist. Plain radiographic images are visualized and preliminarily interpreted by the emergency physician with the below findings:      Interpretation per the Radiologist below, if available at the time of this note:    No orders to display         LABS:  Labs Reviewed   CBC WITH AUTO DIFFERENTIAL - Abnormal; Notable for the following components:       Result Value    Platelets 3 (*)     All other components within normal limits    Narrative:     Kelsey Christensen  [facility] tel. [phone],  Hematology results called to and read back by Zakia BOWLING, 03/26/2022  14:59, by JASBIR   LACTATE DEHYDROGENASE - Abnormal; Notable for the following components:     (*)     All other components within normal limits   BASIC METABOLIC PANEL W/ REFLEX TO MG FOR LOW K   D-DIMER, QUANTITATIVE   PROTIME-INR   FIBRINOGEN   APTT   HCG, SERUM, QUALITATIVE Procedures      FINAL IMPRESSION      1. Idiopathic thrombocytopenia (HCC)          DISPOSITION/PLAN   DISPOSITION        PATIENT REFERRED TO:  No follow-up provider specified. DISCHARGE MEDICATIONS:      New Prescriptions    No medications on file       Controlled Substances Monitoring:    If the patient was prescribed a controlled substance today, the PDMP was reviewed as documented below. No flowsheet data found.     (Please note that portions of this note were completed with a voice recognition program.  Efforts were made to edit the dictations but occasionally words are mis-transcribed.)    Sana Moody MD (electronically signed)  Attending Emergency Physician         Ishan Larkin MD  03/26/22 9087

## 2022-03-27 LAB
ANION GAP SERPL CALCULATED.3IONS-SCNC: 15 MMOL/L (ref 3–16)
BASOPHILS ABSOLUTE: 0 K/UL (ref 0–0.2)
BASOPHILS ABSOLUTE: 0 K/UL (ref 0–0.2)
BASOPHILS RELATIVE PERCENT: 0.1 %
BASOPHILS RELATIVE PERCENT: 0.3 %
BUN BLDV-MCNC: 13 MG/DL (ref 7–20)
CALCIUM SERPL-MCNC: 10.2 MG/DL (ref 8.3–10.6)
CHLORIDE BLD-SCNC: 103 MMOL/L (ref 99–110)
CO2: 22 MMOL/L (ref 21–32)
CREAT SERPL-MCNC: 0.6 MG/DL (ref 0.6–1.1)
EOSINOPHILS ABSOLUTE: 0 K/UL (ref 0–0.6)
EOSINOPHILS ABSOLUTE: 0 K/UL (ref 0–0.6)
EOSINOPHILS RELATIVE PERCENT: 0 %
EOSINOPHILS RELATIVE PERCENT: 0 %
GFR AFRICAN AMERICAN: >60
GFR NON-AFRICAN AMERICAN: >60
GLUCOSE BLD-MCNC: 160 MG/DL (ref 70–99)
HAPTOGLOBIN: 129 MG/DL (ref 30–200)
HCT VFR BLD CALC: 37.5 % (ref 36–48)
HCT VFR BLD CALC: 38.2 % (ref 36–48)
HEMOGLOBIN: 12.6 G/DL (ref 12–16)
HEMOGLOBIN: 12.7 G/DL (ref 12–16)
LYMPHOCYTES ABSOLUTE: 0.3 K/UL (ref 1–5.1)
LYMPHOCYTES ABSOLUTE: 0.8 K/UL (ref 1–5.1)
LYMPHOCYTES RELATIVE PERCENT: 4.3 %
LYMPHOCYTES RELATIVE PERCENT: 8 %
MCH RBC QN AUTO: 30.8 PG (ref 26–34)
MCH RBC QN AUTO: 31 PG (ref 26–34)
MCHC RBC AUTO-ENTMCNC: 33.1 G/DL (ref 31–36)
MCHC RBC AUTO-ENTMCNC: 34 G/DL (ref 31–36)
MCV RBC AUTO: 91 FL (ref 80–100)
MCV RBC AUTO: 93.3 FL (ref 80–100)
MONOCYTES ABSOLUTE: 0 K/UL (ref 0–1.3)
MONOCYTES ABSOLUTE: 0.6 K/UL (ref 0–1.3)
MONOCYTES RELATIVE PERCENT: 0.3 %
MONOCYTES RELATIVE PERCENT: 6.3 %
NEUTROPHILS ABSOLUTE: 6.7 K/UL (ref 1.7–7.7)
NEUTROPHILS ABSOLUTE: 8.7 K/UL (ref 1.7–7.7)
NEUTROPHILS RELATIVE PERCENT: 85.4 %
NEUTROPHILS RELATIVE PERCENT: 95.3 %
PDW BLD-RTO: 14 % (ref 12.4–15.4)
PDW BLD-RTO: 14.1 % (ref 12.4–15.4)
PLATELET # BLD: 5 K/UL (ref 135–450)
PLATELET # BLD: 61 K/UL (ref 135–450)
PMV BLD AUTO: 10.3 FL (ref 5–10.5)
PMV BLD AUTO: 10.4 FL (ref 5–10.5)
POTASSIUM SERPL-SCNC: 3.9 MMOL/L (ref 3.5–5.1)
RBC # BLD: 4.1 M/UL (ref 4–5.2)
RBC # BLD: 4.11 M/UL (ref 4–5.2)
SODIUM BLD-SCNC: 140 MMOL/L (ref 136–145)
WBC # BLD: 10.2 K/UL (ref 4–11)
WBC # BLD: 7.1 K/UL (ref 4–11)

## 2022-03-27 PROCEDURE — 36415 COLL VENOUS BLD VENIPUNCTURE: CPT

## 2022-03-27 PROCEDURE — 6360000002 HC RX W HCPCS: Performed by: INTERNAL MEDICINE

## 2022-03-27 PROCEDURE — 80048 BASIC METABOLIC PNL TOTAL CA: CPT

## 2022-03-27 PROCEDURE — 2580000003 HC RX 258: Performed by: INTERNAL MEDICINE

## 2022-03-27 PROCEDURE — 1200000000 HC SEMI PRIVATE

## 2022-03-27 PROCEDURE — 2580000003 HC RX 258: Performed by: FAMILY MEDICINE

## 2022-03-27 PROCEDURE — 85025 COMPLETE CBC W/AUTO DIFF WBC: CPT

## 2022-03-27 RX ADMIN — SODIUM CHLORIDE, PRESERVATIVE FREE 10 ML: 5 INJECTION INTRAVENOUS at 20:32

## 2022-03-27 RX ADMIN — DEXAMETHASONE SODIUM PHOSPHATE 40 MG: 4 INJECTION, SOLUTION INTRAMUSCULAR; INTRAVENOUS at 00:09

## 2022-03-27 ASSESSMENT — PAIN SCALES - GENERAL
PAINLEVEL_OUTOF10: 0

## 2022-03-27 NOTE — CONSULTS
[facility]   Consult Note      Reason for Consult: Thrombocytopenia    Requesting Physician:  Dr. Keena Reynaga:  Petechiae    History Obtained From: patient    HISTORY OF PRESENT ILLNESS:      57-year-old lady who is otherwise healthy presented to the hospital with home 6 to 7 weeks history of petechiae and bruises. Patient had mild COVID-19 infection in mid January 2022. Petechiae started couple of weeks after the infection. For last 2 to 3 weeks she has noticed petechiae in the mouth. She received dexamethasone 40 mg p.o. once on 3/26/2022. She is finishing first dose of IVIG 1 g/kg now. She reports that petechiae might be somewhat better. Patient does not report any other external bleeding like epistaxis, gum bleeding hemoptysis hematemesis blood in the stools black stools or blood in the urine. She does not report headaches double vision. Otherwise she is feeling fine. She has not taken any new medications or antibiotics in the last 3 months. She does not report using a lot of alcohol in the last few weeks. No fever chills no night sweats  No anorexia or weight loss  No abdominal pain constipation  No swelling in the legs. Past Medical History:     has a past medical history of Pelvic mass.    Past Surgical History:    Past Surgical History:   Procedure Laterality Date    OTHER SURGICAL HISTORY  04/23/2012    SINGLE SITE LAPAROSCOPIC MYOMECTOMY      UTERINE FIBROID SURGERY  2012      Current Medications:    Current Facility-Administered Medications   Medication Dose Route Frequency Provider Last Rate Last Admin    hydrALAZINE (APRESOLINE) injection 10 mg  10 mg IntraVENous Q4H PRN Ronnie Perales MD        sodium chloride flush 0.9 % injection 5-40 mL  5-40 mL IntraVENous 2 times per day Ronnie Perales MD   10 mL at 03/26/22 2005    sodium chloride flush 0.9 % injection 5-40 mL  5-40 mL IntraVENous PRN Ronnie Perales MD        0.9 % sodium chloride infusion  25 mL IntraVENous PRN Clarke Santana MD        ondansetron (ZOFRAN-ODT) disintegrating tablet 4 mg  4 mg Oral Q8H PRN Clarke Santana MD        Or    ondansetron [facility]) injection 4 mg  4 mg IntraVENous Q6H PRN Clarke Santana MD        polyethylene glycol (GLYCOLAX) packet 17 g  17 g Oral Daily PRN Clarke Santana MD        acetaminophen (TYLENOL) tablet 650 mg  650 mg Oral Q6H PRN Clarke Santana MD        Or   Xiomara Gilbert acetaminophen (TYLENOL) suppository 650 mg  650 mg Rectal Q6H PRN MD Xiomara Pain[employer] Laure Crandall ON 3/28/2022] dexamethasone (DECADRON) 40 mg in sodium chloride 0.9 % IVPB  40 mg IntraVENous Daily Letitia Curtis MD         Allergies:    No Known Allergies   Social History:    reports that she has quit smoking. Her smoking use included cigarettes. She has never used smokeless tobacco. She reports current alcohol use of about 1.0 standard drink of alcohol per week. She reports that she does not use drugs. Family History:     family history includes Cancer in her mother; Diabetes in her father and paternal grandmother; Heart Attack in her maternal grandmother and paternal grandfather; Heart Disease in her maternal grandfather, paternal grandfather, and paternal grandmother; Heart Failure in her father; High Cholesterol in her father; Hypertension in her father; Kidney Disease in her father; Prostate Cancer in her father. REVIEW OF SYSTEMS:      · Constitutional: Denies fever, chills, sweats, weight loss. Denies pain. · Eyes: No visual changes or diplopia. No scleral icterus. · ENT: No Headaches, hearing loss or vertigo. No mouth sores or sore throat. · Cardiovascular: No chest pain, dyspnea on exertion, palpitations or loss of consciousness. · Respiratory: No cough or wheezing, no sputum production. No hemoptysis. · Gastrointestinal: No abdominal pain, appetite loss, blood in stools. No change in bowel habits.   · Genitourinary: No dysuria, trouble voiding, or hematuria. · Musculoskeletal:   No generalized weakness. No joint complaints. · Integumentary: No rash or pruritis. · Neurological: No headache, diplopia. No change in gait, balance, or coordination. No focal neurological deficits including weakness, numbness, or tingling. · Endocrine: No temperature intolerance. No excessive thirst, fluid intake, or urination. · Hematologic/Lymphatic: No abnormal bruising or ecchymoses, blood clots or swollen lymph nodes. · Allergic/Immunologic: No nasal congestion or hives. ·     PHYSICAL EXAM:    Vitals:  Vitals:    03/27/22 1200   BP: (!) 158/103   Pulse: 81   Resp: 21   Temp: 97.4 °F (36.3 °C)   SpO2:       CONSTITUTIONAL:  awake, alert, cooperative, no apparent distress  EYES:  pupils equal, round and reactive to light, sclera clear and conjunctiva normal  ENT:  normocepalic, without obvious abnormality, atramatic  NECK:  supple, symmetrical, no jugular venous distension and no carotid bruits  HEMATOLOGIC/LYMPHATICS:  No cervical,supraclavicular or axillary lymphadenopathy  LUNGS:  No increased work of breathing and clear to auscultation  CARDIOVASCULAR: Regular rate and rhythm, normal S1 and S2, no murmur noted  ABDOMEN:  Normal bowel sounds x 4, soft, non-distended, non-tender, no masses palpated, no hepatosplenomegally  MUSCULOSKELETAL:  full range of motion noted, tone is normal  EXTREMITIES: no LE edema  NEUROLOGIC:  Awake, alert, oriented to name, place and time. Motor skills grossly intact. SKIN: Petechiae and ecchymosis noted over the legs as well as the forearms.     DATA:  General Labs:    CBC:   Recent Labs     03/25/22  1317 03/26/22  1420 03/27/22  0340   WBC 8.8 7.8 7.1   HGB 12.7 13.2 12.7   HCT 37.4 39.0 37.5   MCV 91.9 91.1 91.0   PLT 3* 3* 5*     BMP:   Recent Labs     03/26/22  1420 03/27/22  0340    140   K 3.8 3.9   CL 99 103   CO2 25 22   BUN 10 13   CREATININE 0.7 0.6     LIVER PROFILE: No results for input(s): AST, ALT, LIPASE, BILIDIR, BILITOT, ALKPHOS in the last 72 hours. Invalid input(s): AMYLASE,  ALB  PT/INR:    Lab Results   Component Value Date    PROTIME 11.5 03/26/2022    PROTIME 10.8 03/24/2022    INR 1.02 03/26/2022    INR 0.96 03/24/2022     PTT:    Lab Results   Component Value Date    APTT 37.4 03/26/2022     Magnesium:  No results found for: MG    Imaging:  No results found. Assessment & Plan:    75-year-old lady has    1. Thrombocytopenia:    -This is severe  -This is immune thrombocytopenia secondary to COVID-19 infection  -Platelet count was 3 in the emergency room on 3/26/2022. Hemoglobin and WBCs were normal  -Peripheral smear showed large platelets. There were no clumps  -Fibrinogen was normal  - BETH, RA, hep C were negative on 3/24/2022  -She received dexamethasone 40 mg p.o. once on 3/26/2022  -She also received IVIG 1 g/kg IV once which is finishing up just now. -Today platelet count is 5.  -We will recheck CBC in the evening.    -We will continue dexamethasone 40 mg p.o. daily and complete 4 days of treatment.  -Continue Protonix    -Depending on tonight's CBC, will decide if she needs additional IVIG or not. I have discussed the above stated plan with the patient and they verbalized understanding and agreed with the plan. Thank you for allowing us to participate in this patients care.     Moriah Heck MD  3/27/2022,

## 2022-03-27 NOTE — PROGRESS NOTES
[facility] PROGRESS NOTE    3/27/2022 12:31 PM        Name: Ilene Mejia . Admitted: 3/26/2022  Primary Care Provider: MARCIA Haynes CNP (Tel: [phone])        Subjective: In bed no bleeding does not feel lightheaded or dizzy no chest pain or shortness of breath no nausea vomiting    Reviewed interval ancillary notes    Current Medications  hydrALAZINE (APRESOLINE) injection 10 mg, Q4H PRN  sodium chloride flush 0.9 % injection 5-40 mL, 2 times per day  sodium chloride flush 0.9 % injection 5-40 mL, PRN  0.9 % sodium chloride infusion, PRN  ondansetron (ZOFRAN-ODT) disintegrating tablet 4 mg, Q8H PRN   Or  ondansetron (ZOFRAN) injection 4 mg, Q6H PRN  polyethylene glycol (GLYCOLAX) packet 17 g, Daily PRN  acetaminophen (TYLENOL) tablet 650 mg, Q6H PRN   Or  acetaminophen (TYLENOL) suppository 650 mg, Q6H PRN  [START ON 3/28/2022] dexamethasone (DECADRON) 40 mg in sodium chloride 0.9 % IVPB, Daily        Objective:  BP (!) 137/92   Pulse 87   Temp 96.4 °F (35.8 °C) (Temporal)   Resp 18   Ht 5' 6\" (1.676 m)   Wt 157 lb 6.5 oz (71.4 kg)   LMP 03/03/2022   SpO2 99%   BMI 25.41 kg/m²     Intake/Output Summary (Last 24 hours) at 3/27/2022 1231  Last data filed at 3/27/2022 0935  Gross per 24 hour   Intake 2110 ml   Output 2750 ml   Net -640 ml      Wt Readings from Last 3 Encounters:   03/27/22 157 lb 6.5 oz (71.4 kg)   03/24/22 162 lb (73.5 kg)   10/27/21 155 lb (70.3 kg)       General appearance:  Appears comfortable. AAOx3  HEENT: atraumatic, Pupils equal, muscous membranes moist, no masses appreciated  Cardiovascular: Regular rate and rhythm no murmurs appreciated  Respiratory: CTAB no wheezing  Gastrointestinal: Abdomen soft, non-tender, BS+  EXT: no edema  Neurology: no gross focal deficts  Psychiatry: Appropriate affect.  Not agitated  Skin: petechiabe BLE    Labs and Tests:  CBC:   Recent Labs     03/25/22  1317 03/26/22  1420 03/27/22  0340   WBC 8.8 7.8 7.1   HGB 12.7 13.2 12.7   PLT 3* 3* 5*     BMP:    Recent Labs     03/26/22  1420 03/27/22  0340    140   K 3.8 3.9   CL 99 103   CO2 25 22   BUN 10 13   CREATININE 0.7 0.6   GLUCOSE 92 160*     Hepatic: No results for input(s): AST, ALT, ALB, BILITOT, ALKPHOS in the last 72 hours. No orders to display       Recent imaging reviewed    Problem List  Principal Problem: Thrombocytopenia due to COVID-19 virus  Resolved Problems:    * No resolved hospital problems. *       Assessment & Plan:   Severe thrombocytopenia associated with  post COVID syndrome  - iv gi and decadron  - hematology formal consult penidng  - hgb stable plt slightly improved monitor closely   -repeat labs in am      Diet: ADULT DIET;  Regular  Code:Full Code        Amalia Wilde MD   3/27/2022 12:31 PM

## 2022-03-27 NOTE — PROGRESS NOTES
Pt reassessed, no acute changes noted. Remains up in chair without any c/o. VSS  See flow sheets for complete assessment.

## 2022-03-27 NOTE — PLAN OF CARE
Problem: SAFETY  Goal: Free from accidental physical injury  3/27/2022 0218 by Jose Lau RN  Outcome: Ongoing  3/26/2022 1832 by Maico Duran RN  Outcome: Ongoing     Problem: DAILY CARE  Goal: Daily care needs are met  3/27/2022 0218 by Jose Lau RN  Outcome: Ongoing  3/26/2022 1832 by Maico Duran RN  Outcome: Ongoing     Problem: PAIN  Goal: Patient's pain/discomfort is manageable  3/27/2022 0218 by Jose Lau RN  Outcome: Ongoing  3/26/2022 1832 by Maico Duran RN  Outcome: Ongoing     Problem: SKIN INTEGRITY  Goal: Skin integrity is maintained or improved  3/27/2022 0218 by Jose Lau RN  Outcome: Ongoing  3/26/2022 1832 by Maico Duran RN  Outcome: Ongoing     Problem: KNOWLEDGE DEFICIT  Goal: Patient/S.O. demonstrates understanding of disease process, treatment plan, medications, and discharge instructions.   3/27/2022 0218 by Jose Lau RN  Outcome: Ongoing  3/26/2022 1832 by Maico Duran RN  Outcome: Ongoing

## 2022-03-27 NOTE — PROGRESS NOTES
Increase in redness and splotches on her left hand. Receiving Immune-Globulin drip. 1st 100 cc of 700 cc infused. Perfect Serve message sent to MD.  Dr. Kristi Tovar order for Decadron infusion and will administer. See MAR.

## 2022-03-27 NOTE — PLAN OF CARE
Problem: SAFETY  Goal: Free from accidental physical injury  Outcome: Ongoing   Bed low, equipment out of the way  Call light in reach    Problem: DAILY CARE  Goal: Daily care needs are met  Outcome: Ongoing   All daily care being done by pt

## 2022-03-27 NOTE — H&P
HOSPITALISTS HISTORY AND PHYSICAL    3/26/2022 9:10 PM    Patient Information:  Leida Correa is a 50 y.o. female [id number]  PCP:  MARCIA Dove CNP (Tel: [phone] )    Chief complaint:    Chief Complaint   Patient presents with    Abnormal Test Results     pt had blood drawn yesterday, PLT 3. states she had Covid in Jan 2022 and the bruising and peticceal rash started then. History of Present Illness:  Jewell Oropeza is a 50 y.o. female was sent by PCP d/t low platelet count. The pt visited her PCP office 2 days ago c/o easy bruising and  petechial rash on her arms, legs and inside her mouth on tongue and lips. Labs were ordered that revealed plt count of 3. She was called to go to the eD . She was tested positive for COVID - 19 back in [address] , 20 . She started having these symptoms after that . Denies blood in the stools or urine. Labs revealed plt count of 3 .  hematology has been consulted . Who recommended treatment with decadron and IVIG      History obtained and   Old medical records reviewed        REVIEW OF SYSTEMS:   Constitutional: Negative for fever,chills or night sweats  ENT: Negative for rhinorrhea, epistaxis, hoarseness, sore throat. Respiratory: Negative for shortness of breath,wheezing  Cardiovascular: Negative for chest pain, palpitations   Gastrointestinal: Negative for nausea, vomiting, diarrhea  Genitourinary: Negative for polyuria, dysuria   Hematologic/Lymphatic: Negative for bleeding tendency, easy bruising  Musculoskeletal: Negative for myalgias and arthralgias  Neurologic: Negative for confusion,dysarthria. Skin: Negative for itching,rash  Psychiatric: Negative for depression,anxiety, agitation. Endocrine: Negative for polydipsia,polyuria,heat /cold intolerance. Past Medical History:   has a past medical history of Pelvic mass.      Past Surgical History:   has a past surgical history that includes other surgical history (04/23/2012) and Uterine fibroid surgery (2012). Medications:  No current facility-administered medications on file prior to encounter. Current Outpatient Medications on File Prior to Encounter   Medication Sig Dispense Refill    LO LOESTRIN FE 1 MG-10 MCG / 10 MCG tablet TAKE 1 TABLET BY MOUTH DAILY. TAKE PILLS IN THE ORDER DIRECTED ON BLISTER PACK.       Cyanocobalamin (B-12) 1000 MCG TABS Take by mouth      QUERCETIN PO Take 750 mg by mouth      Magnesium 500 MG CAPS Take by mouth      vitamin C (ASCORBIC ACID) 500 MG tablet Take 500 mg by mouth daily      Vitamin D-Vitamin K (K2 PLUS D3 PO) Take 5,000 Units by mouth      zinc 50 MG CAPS Take by mouth every other day      VITAMIN K PO Take 550 mcg by mouth daily      selenium 50 MCG tablet Take 50 mcg by mouth daily      Krill Oil 1000 MG CAPS Take by mouth       Current Facility-Administered Medications   Medication Dose Route Frequency Provider Last Rate Last Admin    hydrALAZINE (APRESOLINE) injection 10 mg  10 mg IntraVENous Q4H PRN Laurent Lee MD        sodium chloride flush 0.9 % injection 5-40 mL  5-40 mL IntraVENous 2 times per day Laurent Lee MD   10 mL at 03/26/22 2005    sodium chloride flush 0.9 % injection 5-40 mL  5-40 mL IntraVENous PRN Laurent Lee MD        0.9 % sodium chloride infusion  25 mL IntraVENous PRN Laurent Lee MD        ondansetron (ZOFRAN-ODT) disintegrating tablet 4 mg  4 mg Oral Q8H PRN Laurent Lee MD        Or    ondansetron (ZOFRAN) injection 4 mg  4 mg IntraVENous Q6H PRN Laurent Lee MD        polyethylene glycol (GLYCOLAX) packet 17 g  17 g Oral Daily PRN Laurent Lee MD        acetaminophen (TYLENOL) tablet 650 mg  650 mg Oral Q6H PRN Laurent Lee MD        Or   [place] acetaminophen (TYLENOL) suppository 650 mg  650 mg Rectal Q6H PRN Laurent Lee MD        [START ON 3/27/2022] dexamethasone (DECADRON) 40 mg in sodium chloride 0.9 % IVPB  40 mg IntraVENous Daily Karen Vinson MD         Allergies:  No Known Allergies     Social History:  Patient Lives    reports that she has quit smoking. Her smoking use included cigarettes. She has never used smokeless tobacco. She reports current alcohol use of about 1.0 standard drink of alcohol per week. She reports that she does not use drugs. Family History:  family history includes Cancer in her mother; Diabetes in her father and paternal grandmother; Heart Attack in her maternal grandmother and paternal grandfather; Heart Disease in her maternal grandfather, paternal grandfather, and paternal grandmother; Heart Failure in her father; High Cholesterol in her father; Hypertension in her father; Kidney Disease in her father; Prostate Cancer in her father. ,     Physical Exam:  /80   Pulse 97   Temp 96.8 °F (36 °C) (Temporal)   Resp 11   Ht 5' 6\" (1.676 m)   Wt 157 lb 6.5 oz (71.4 kg)   LMP 03/03/2022   SpO2 98%   BMI 25.41 kg/m²     General appearance:  Appears comfortable. Well nourished  Eyes: Sclera clear, pupils equal  ENT: Moist mucus membranes, no thrush. Trachea midline. Cardiovascular: Regular rhythm, normal S1, S2. No murmur, gallop, rub. No edema in lower extremities  Respiratory: Clear to auscultation bilaterally, no wheeze, good inspiratory effort  Gastrointestinal: Abdomen soft, non-tender, not distended, normal bowel sounds  Musculoskeletal: No cyanosis in digits, neck supple  Neurology: Cranial nerves grossly intact. Alert and oriented in time, place and person. No speech or motor deficits  Psychiatry: Appropriate affect.  Not agitated  Skin: Warm, dry, normal turgor, no rash  Brisk capillary refill, peripheral pulses palpable   Labs:  CBC:   Lab Results   Component Value Date    WBC 7.8 03/26/2022    RBC 4.28 03/26/2022    HGB 13.2 03/26/2022    HCT 39.0 03/26/2022    MCV 91.1 03/26/2022    MCH 30.8 03/26/2022    MCHC 33.8 03/26/2022    RDW 13.6 03/26/2022    PLT 3 03/26/2022    MPV 9.0 03/26/2022     BMP:    Lab Results   Component Value Date     03/26/2022    K 3.8 03/26/2022    CL 99 03/26/2022    CO2 25 03/26/2022    BUN 10 03/26/2022    CREATININE 0.7 03/26/2022    CALCIUM 10.2 03/26/2022    GFRAA >60 03/26/2022    GFRAA >60 03/13/2012    LABGLOM >60 03/26/2022    GLUCOSE 92 03/26/2022     No orders to display     Chest Xray:   EKG:    I visualized CXR images and EKG strips    Discussed case  with     Problem List  Principal Problem: Thrombocytopenia due to COVID-19 virus  Resolved Problems:    * No resolved hospital problems. *        Assessment/Plan:   Severe thrombocytopenia associated with  post COVID syndrome  plt count of 3  No acute GI/  bleeding   Hb 13.2  Cont Decadron   hematology has been consulted       DVT prophylaxis   Code status full   Diet   IV access   Pena Catheter    Admit as inpatient. I anticipate hospitalization spanning more than two midnights for investigation and treatment of the above medically necessary diagnoses. Please note that some part of this chart was generated using Dragon dictation software. Although every effort was made to ensure the accuracy of this automated transcription, some errors in transcription may have occurred inadvertently. If you may need any clarification, please do not hesitate to contact me through [facility].        Jack Moseley MD    3/26/2022 9:10 PM

## 2022-03-27 NOTE — PROGRESS NOTES
Pt up in chair, A/Ox4  VSS  Except BP elevated. Pt denies any c/o. Monitor NSR  IVIG infusing without any problems. See flow sheets for complete assessment.  at bedside. Pt instructed to notify RN of any signs of bleeding.   Pt v/u

## 2022-03-27 NOTE — PROGRESS NOTES
Nursing assessment completed. VSS. Denies pain. Cool temp 96.8 and 96.4 but skin is warm, natural, dry with scattered ecchymosis/bruising and petechiae on bilateral upper and bilateral lower extremities. BLE darkened skin on anterior lower legs. Patient denies tanning. Discussed plan of care. Pharmacy consulted with re: her immune globulin (GAMUNEX-C) 10% solution 70 g. Based on her weight 71.8 kg, she is to receive medication at 42.84 cc/hr the first 15 minutes. Nursing is to monitor her Vital Signs and any/all symptoms or reactions to the med. After 15 minutes may increase the medication to 85.68 cc/hr. 2 nurses reviewed the pump and the medication. This nurse reviewed with pharmacy how the medication should be administered and the plan of care. Patient sitting up in chair. Neuro intact. A&Ox4 and WNL. Lungs CTA. ABD soft +BSx4. Voiding per bathroom and will place hat to monitor color and amount of urine. Pulses +ppp and strong. #22 ga PIV right hand site unremarkable and flushes well. She prefers to sit up in chair for now. Call light in reach. Asked her to please call nurse for all needs. Fresh ice water provided.

## 2022-03-27 NOTE — PROGRESS NOTES
Pt reassessed, no acute changes noted. Continues up in chair without c/o. BP labile, occasionally elevated. Monitor NSR. See flow sheets for complete assessment.  Up in room ad loreta without problems

## 2022-03-27 NOTE — PROGRESS NOTES
Reduced Immune Globulin drip back to 42 cc/hr per pharmacy recommendation in setting of left hand increased redness. Dr Adrienne Oconnor has ordered Decadron IVPB. Will restart new IV.

## 2022-03-27 NOTE — PROGRESS NOTES
2nd bottle of Immune Globulin 200 cc hung. Infusion rate infusing at 42 cc/hr. Attempting to start 2nd PIV for decadron IVPB.

## 2022-03-27 NOTE — PROGRESS NOTES
2nd IV unable to flush. Restarted new PIV left hand #22 ga.  +blood return and flushes well. Patient prefers to sit up in chair. Palms of both her hands blush red and left hand red juliette. Left arm elevated up on pillow. Decadron IVPB infusing through #22 ga left hand PIV site unremarkable. Immune globulin infusing Right hand #22 ga PIV site unremarkable. Call light in reach.

## 2022-03-27 NOTE — PROGRESS NOTES
Nursing reassessment completed. No increase in redness or petechaie in left hand. Immune Globulin drip infusing. Tolerating well thus far. VSS. Temp 96.4. Skin warm natural dry with scattered petechaie, bruising and scratches. NSR 91.  RR 19. /82 (105). Apresoline 10 mg prn available for SBP > 160. Patient prefers to sit up in chair versus sleep in bed. Provided fresh fluids. Call light in reach. Assistance up to bathroom, but her gait is steady.

## 2022-03-28 VITALS
BODY MASS INDEX: 25.09 KG/M2 | TEMPERATURE: 97 F | DIASTOLIC BLOOD PRESSURE: 90 MMHG | OXYGEN SATURATION: 98 % | SYSTOLIC BLOOD PRESSURE: 138 MMHG | RESPIRATION RATE: 16 BRPM | WEIGHT: 156.09 LBS | HEIGHT: 66 IN | HEART RATE: 79 BPM

## 2022-03-28 LAB
ANION GAP SERPL CALCULATED.3IONS-SCNC: 13 MMOL/L (ref 3–16)
BASOPHILS ABSOLUTE: 0 K/UL (ref 0–0.2)
BASOPHILS RELATIVE PERCENT: 0.5 %
BUN BLDV-MCNC: 18 MG/DL (ref 7–20)
CALCIUM SERPL-MCNC: 10 MG/DL (ref 8.3–10.6)
CHLORIDE BLD-SCNC: 104 MMOL/L (ref 99–110)
CO2: 24 MMOL/L (ref 21–32)
CREAT SERPL-MCNC: 0.7 MG/DL (ref 0.6–1.1)
EOSINOPHILS ABSOLUTE: 0 K/UL (ref 0–0.6)
EOSINOPHILS RELATIVE PERCENT: 0 %
GFR AFRICAN AMERICAN: >60
GFR NON-AFRICAN AMERICAN: >60
GLUCOSE BLD-MCNC: 98 MG/DL (ref 70–99)
HAPTOGLOBIN: 124 MG/DL (ref 30–200)
HCT VFR BLD CALC: 35.1 % (ref 36–48)
HEMATOLOGY PATH CONSULT: NORMAL
HEMATOLOGY PATH CONSULT: NORMAL
HEMOGLOBIN: 11.9 G/DL (ref 12–16)
LACTATE DEHYDROGENASE: 178 U/L (ref 100–190)
LYMPHOCYTES ABSOLUTE: 1.9 K/UL (ref 1–5.1)
LYMPHOCYTES RELATIVE PERCENT: 21.7 %
MCH RBC QN AUTO: 30.7 PG (ref 26–34)
MCHC RBC AUTO-ENTMCNC: 33.8 G/DL (ref 31–36)
MCV RBC AUTO: 90.9 FL (ref 80–100)
MONOCYTES ABSOLUTE: 0.7 K/UL (ref 0–1.3)
MONOCYTES RELATIVE PERCENT: 7.5 %
NEUTROPHILS ABSOLUTE: 6.3 K/UL (ref 1.7–7.7)
NEUTROPHILS RELATIVE PERCENT: 70.3 %
PDW BLD-RTO: 13.9 % (ref 12.4–15.4)
PLATELET # BLD: 105 K/UL (ref 135–450)
PMV BLD AUTO: 10 FL (ref 5–10.5)
POTASSIUM SERPL-SCNC: 4.2 MMOL/L (ref 3.5–5.1)
RBC # BLD: 3.86 M/UL (ref 4–5.2)
SODIUM BLD-SCNC: 141 MMOL/L (ref 136–145)
WBC # BLD: 8.9 K/UL (ref 4–11)

## 2022-03-28 PROCEDURE — 2580000003 HC RX 258: Performed by: INTERNAL MEDICINE

## 2022-03-28 PROCEDURE — 2580000003 HC RX 258: Performed by: FAMILY MEDICINE

## 2022-03-28 PROCEDURE — 6360000002 HC RX W HCPCS: Performed by: INTERNAL MEDICINE

## 2022-03-28 PROCEDURE — 83615 LACTATE (LD) (LDH) ENZYME: CPT

## 2022-03-28 PROCEDURE — 36415 COLL VENOUS BLD VENIPUNCTURE: CPT

## 2022-03-28 PROCEDURE — 83010 ASSAY OF HAPTOGLOBIN QUANT: CPT

## 2022-03-28 PROCEDURE — 85025 COMPLETE CBC W/AUTO DIFF WBC: CPT

## 2022-03-28 PROCEDURE — 80048 BASIC METABOLIC PNL TOTAL CA: CPT

## 2022-03-28 RX ADMIN — SODIUM CHLORIDE, PRESERVATIVE FREE 10 ML: 5 INJECTION INTRAVENOUS at 09:30

## 2022-03-28 RX ADMIN — DEXAMETHASONE SODIUM PHOSPHATE 40 MG: 4 INJECTION, SOLUTION INTRAMUSCULAR; INTRAVENOUS at 09:30

## 2022-03-28 RX ADMIN — SODIUM CHLORIDE, PRESERVATIVE FREE 10 ML: 5 INJECTION INTRAVENOUS at 09:29

## 2022-03-28 ASSESSMENT — PAIN SCALES - GENERAL
PAINLEVEL_OUTOF10: 0

## 2022-03-28 NOTE — PROGRESS NOTES
Handoff report received and patient assessment completed. A&O x4, VSS. SR per monitor with no ectopy to note. Denies any pain or discomfort. Ambulating throughout room with steady gait. Adequate I&O. Scattered bruising/petechiae noted but has not increased. Denies any sign of blood in urine or stool.  at bedside and interactive in care. POC discussed and all questions addressed. Will cont to monitor.

## 2022-03-28 NOTE — PROGRESS NOTES
CLINICAL PHARMACY NOTE: MEDS TO BEDS    Total # of Prescriptions Filled: 1   The following medications were delivered to the patient:  · Dexamethasone 4 mg    Additional Documentation:    Patient picked up from Outpatient pharmacy=Signed  Mare Shannon CPhT

## 2022-03-28 NOTE — PROGRESS NOTES
Patient seen by heme-ocology and hospitalist.Platelet count WNL. Discharged home with prednisone,plan to follow up with Hematology,and education regarding clotting disorders. Understanding voiced,discharged home with spouse.

## 2022-03-28 NOTE — DISCHARGE SUMMARY
Hospital Medicine Discharge Summary    Patient: Simona Jacob     Gender: female  : 1975   Age: 50 y.o. MRN: [id number]    Admitting Physician: Kam Duggan MD  Discharge Physician: Betty Matias MD     Code Status: Full Code     Admit Date: 3/26/2022   Discharge Date:   3/28/22    Disposition:  Home  Time spent arranging discharge: 35 minutes    Discharge Diagnoses: Active Hospital Problems    Diagnosis Date Noted    Thrombocytopenia due to COVID-19 virus [U07.1, D69.59] 2022       Condition at Discharge:  Stable    Hospital Course:   Admitted to hospital thrombocytopenia secondary to post COVID-19 syndrome. Patient platelet count was 3 is improved to 100 was treated with IVIG and Decadron 40 mg. Patient was started 1 more dose of Decadron 40 mg p.o. tomorrow and follow-up with heme-onc on Thursday. Did have a petechial rash in bilateral lower extremities that has improved    Discharge Exam:    BP (!) 143/91   Pulse 81   Temp 96.8 °F (36 °C) (Temporal)   Resp 18   Ht 5' 6\" (1.676 m)   Wt 156 lb 1.4 oz (70.8 kg)   LMP 2022   SpO2 98%   BMI 25.19 kg/m²   General appearance:  Appears comfortable. AAOx3  HEENT: atraumatic, Pupils equal, muscous membranes moist, no masses appreciated  Cardiovascular: Regular rate and rhythm no murmurs appreciated  Respiratory: CTAB no wheezing  Gastrointestinal: Abdomen soft, non-tender, BS+  EXT: no edema  Neurology: no gross focal deficts  Psychiatry: Appropriate affect.  Not agitated  Skin: Lower extremity petechial rash improving  Discharge Medications:   Current Discharge Medication List      START taking these medications    Details   dexamethasone 20 MG TABS Take 40 mg by mouth daily (with breakfast) for 1 day  Qty: 2 tablet, Refills: 0           Current Discharge Medication List        Current Discharge Medication List      CONTINUE these medications which have NOT CHANGED    Details   LO LOESTRIN FE 1 MG-10 MCG / 10 MCG

## 2022-03-28 NOTE — PROGRESS NOTES
[facility]   Progress Note      3/28/2022 8:01 AM        Name: Devin Carey . Admitted: 3/26/2022    SUBJECTIVE:  She is feeling well,  No bleeding    Reviewed interval ancillary notes    Current Medications  hydrALAZINE (APRESOLINE) injection 10 mg, Q4H PRN  sodium chloride flush 0.9 % injection 5-40 mL, 2 times per day  sodium chloride flush 0.9 % injection 5-40 mL, PRN  0.9 % sodium chloride infusion, PRN  ondansetron (ZOFRAN-ODT) disintegrating tablet 4 mg, Q8H PRN   Or  ondansetron (ZOFRAN) injection 4 mg, Q6H PRN  polyethylene glycol (GLYCOLAX) packet 17 g, Daily PRN  acetaminophen (TYLENOL) tablet 650 mg, Q6H PRN   Or  acetaminophen (TYLENOL) suppository 650 mg, Q6H PRN  dexamethasone (DECADRON) 40 mg in sodium chloride 0.9 % IVPB, Daily        Objective:  BP (!) 143/91   Pulse 81   Temp 96.8 °F (36 °C) (Temporal)   Resp 18   Ht 5' 6\" (1.676 m)   Wt 156 lb 1.4 oz (70.8 kg)   LMP 03/03/2022   SpO2 98%   BMI 25.19 kg/m²     Intake/Output Summary (Last 24 hours) at 3/28/2022 0801  Last data filed at 3/28/2022 0014  Gross per 24 hour   Intake 2377.7 ml   Output 1000 ml   Net 1377.7 ml      Wt Readings from Last 3 Encounters:   03/28/22 156 lb 1.4 oz (70.8 kg)   03/24/22 162 lb (73.5 kg)   10/27/21 155 lb (70.3 kg)       General appearance:  Appears comfortable  Eyes: Sclera clear. Pupils equal.  ENT: Moist oral mucosa. Trachea midline, no adenopathy. Cardiovascular: Regular rhythm, normal S1, S2. No murmur. No edema in lower extremities  Respiratory: Not using accessory muscles. Good inspiratory effort. Clear to auscultation bilaterally, no wheeze or crackles. GI: Abdomen soft, no tenderness, not distended  Musculoskeletal: No cyanosis in digits, neck supple  Neurology: CN 2-12 grossly intact. No speech or motor deficits  Psych: Normal affect.  Alert and oriented in time, place and person  Skin: Warm, dry, normal turgor    Labs and Tests:  CBC:   Recent Labs 03/27/22  0340 03/27/22  1547 03/28/22  0438   WBC 7.1 10.2 8.9   HGB 12.7 12.6 11.9*   PLT 5* 61* 105*     BMP:    Recent Labs     03/26/22  1420 03/27/22  0340 03/28/22  0438    140 141   K 3.8 3.9 4.2   CL 99 103 104   CO2 25 22 24   BUN 10 13 18   CREATININE 0.7 0.6 0.7   GLUCOSE 92 160* 98     Hepatic: No results for input(s): AST, ALT, ALB, BILITOT, ALKPHOS in the last 72 hours. ASSESSMENT AND PLAN    Principal Problem: Thrombocytopenia due to COVID-19 virus  Resolved Problems:    * No resolved hospital problems. *      1. Thrombocytopenia:     -This is severe  -This is immune thrombocytopenia secondary to COVID-19 infection  -Platelet count was 3 in the emergency room on 3/26/2022. Hemoglobin and WBCs were normal  -Peripheral smear showed large platelets. There were no clumps  -Fibrinogen was normal  - BETH, RA, hep C were negative on 3/24/2022  -She received dexamethasone 40 mg p.o. once on 3/26/2022  -She also received IVIG 1 g/kg IV once      -Today platelet count is 598. -We will continue dexamethasone 40 mg p.o. daily and complete 4 days of treatment.  -Continue Protonix     OK for discharge from hematology perspective. She will f/u in office on Thursday, 3/31/22. Please let patient know that she does not have to f/u on Tuesday as previously discussed. Andre Craig CNP  [facility]    Patient was seen and examined. She is doing well  Petechiae and ecchymosis are fading away. Platelet count has improved to 105. Hemoglobin has decreased slightly. We will check LDH and haptoglobin to make sure she is not hemolyzing. Okay to discharge patient. She should complete dexamethasone 40 mg p.o. daily for total of 4 days.   Will ensure outpatient follow-up on 3/31/2022    Caitlin Esparza MD

## 2022-04-18 ASSESSMENT — ENCOUNTER SYMPTOMS
WHEEZING: 0
COLOR CHANGE: 0
RHINORRHEA: 0
NAUSEA: 0
SHORTNESS OF BREATH: 0
VOMITING: 0
CHEST TIGHTNESS: 0
BACK PAIN: 0
DIARRHEA: 0
COUGH: 0
CONSTIPATION: 0
ABDOMINAL PAIN: 0

## 2022-04-19 ENCOUNTER — OFFICE VISIT (OUTPATIENT)
Dept: FAMILY MEDICINE CLINIC | Age: 47
End: 2022-04-19
Payer: COMMERCIAL

## 2022-04-19 VITALS
WEIGHT: 163 LBS | OXYGEN SATURATION: 99 % | DIASTOLIC BLOOD PRESSURE: 82 MMHG | TEMPERATURE: 97.3 F | SYSTOLIC BLOOD PRESSURE: 134 MMHG | HEART RATE: 91 BPM | BODY MASS INDEX: 26.31 KG/M2

## 2022-04-19 DIAGNOSIS — Z00.00 ANNUAL PHYSICAL EXAM: Primary | ICD-10-CM

## 2022-04-19 DIAGNOSIS — Z12.11 COLON CANCER SCREENING: ICD-10-CM

## 2022-04-19 LAB
BILIRUBIN, POC: NORMAL
BLOOD URINE, POC: NORMAL
CLARITY, POC: CLEAR
COLOR, POC: NORMAL
GLUCOSE URINE, POC: NORMAL
KETONES, POC: NORMAL
LEUKOCYTE EST, POC: NORMAL
NITRITE, POC: NORMAL
PH, POC: 7
PROTEIN, POC: NORMAL
SPECIFIC GRAVITY, POC: 1.01
UROBILINOGEN, POC: 0.2

## 2022-04-19 PROCEDURE — 99396 PREV VISIT EST AGE 40-64: CPT | Performed by: CLINICAL NURSE SPECIALIST

## 2022-04-19 PROCEDURE — 81002 URINALYSIS NONAUTO W/O SCOPE: CPT | Performed by: CLINICAL NURSE SPECIALIST

## 2022-04-19 NOTE — PATIENT INSTRUCTIONS
Reviewed labs     Discussed colon cancer screening, referral given    Follow up with hematology as needed/directed  Patient Education        Colon Cancer Screening: Care Instructions  Overview     Colorectal cancer occurs in the colon or rectum. That's the lower part of your digestive system. It often starts in small growths called polyps in the colon or rectum. Polyps are usually found with screening tests. Depending on the typeof test, any polyps found may be removed during the tests. Colorectal cancer usually does not cause symptoms at first. But regular testscan help find it early, before it spreads and becomes harder to treat. Your risk for colorectal cancer gets higher as you get older. Experts recommend starting screening at age 39 for people who are at average risk. Talk with your doctor about your risk and when to start and stop screening. You may have oneof several tests. Follow-up care is a key part of your treatment and safety. Be sure to make and go to all appointments, and call your doctor if you are having problems. It's also a good idea to know your test results and keep alist of the medicines you take. What are the main screening tests for colon cancer? The screening tests are:  Stool tests. These include the guaiac fecal occult blood test (gFOBT), the fecal immunochemical test (FIT), and the combined fecal immunochemical test and stool DNA test (FIT-DNA). These tests check stool samples for signs of cancer. Ifyour test is positive, you will need to have a colonoscopy. Sigmoidoscopy. This test lets your doctor look at the lining of your rectum and the lowest part of your colon. Your doctor uses a lighted tube called a sigmoidoscope. This test can't find cancers or polyps in the upper part of your colon. In some cases, polyps that are found can be removed. But if your doctor finds polyps,you will need to have a colonoscopy to check the upper part of your colon. Colonoscopy.   This test lets your doctor look at the lining of your rectum and your entire colon. The doctor uses a thin, flexible tool called a colonoscope. It can alsobe used to remove polyps or get a tissue sample (biopsy). A less common test is CT colonography (CTC). It's also called virtualcolonoscopy. Who should be screened for colorectal cancer? Your risk for colorectal cancer gets higher as you get older. Experts recommend starting screening at age 39 for people who are at average risk. Talk with yourdoctor about your risk and when to start and stop screening. How often you need screening depends on the type of test you get:  Stool tests. Every year for FIT or gFOBT. Every 1 to 3 years for sDNA, also called FIT-DNA. Tests that look inside the colon. Every 5 years for sigmoidoscopy. (If you do the FIT test every year, you can get this test every 10 years.)  Every 5 years for CT colonography (virtual colonoscopy). Every 10 years for colonoscopy. Experts agree that people at higher risk may need to be tested sooner and more often. This includes people who have a strong family history of colon cancer. Talk to your doctor about which test is best for you and when to be tested. When should you call for help? Watch closely for changes in your health, and be sure to contact your doctor if:     You have any changes in your bowel habits.      You have any problems. Where can you learn more? Go to https://Garlikpemaddison"[email or web address] org and sign in to your [employer] account. Enter [phone] in the [facility] box to learn more about \"Colon Cancer Screening: Care Instructions. \"     If you do not have an account, please click on the \"Sign Up Now\" link. Current as of: September 8, 2021               Content Version: 13.2  © 2185-3581 [employer], [employer]. Care instructions adapted under license by [facility] ([facility]).  If you have questions about a medical condition or this instruction, always ask your healthcare professional. [employer] disclaims any warranty or liability for your use of this information.

## 2022-04-19 NOTE — PROGRESS NOTES
SUBJECTIVE:    Patient ID:  Sekou Garza is a 50 y.o. female      Patient is here for a complete physical.  She has no questions or concerns regarding her health. 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 She was seen by the nurse practitioner at the hematologist on 3/31/2022 and her platelet count was 711. She has a follow-up appointment with hematologist on 5/12/2022. Medications: None  Supplements: vitamins as listed  Diet: fairly healthy  Activity: she working out 3 days a week and walking dogs nightly  Safety: Uses sunscreen when out for extended periods of time, wears their seatbelt, does not drink and drive, non-smoker    Labs reviewed from 3/24/2022 CBC with platelet count of 3, see MP was essentially normal, fasting glucose 105, A1c 4.7, total cholesterol 231, triglycerides 59, HDL 94, , TSH 0.90, hepatitis C and HIV are nonreactive, sedimentation rate 16, BETH was negative, rheumatoid factor was less than 10, pro time 10.8, INR 0.96        Current Outpatient Medications on File Prior to Visit   Medication Sig Dispense Refill    LO LOESTRIN FE 1 MG-10 MCG / 10 MCG tablet TAKE 1 TABLET BY MOUTH DAILY. TAKE PILLS IN THE ORDER DIRECTED ON BLISTER PACK.  Cyanocobalamin (B-12) 1000 MCG TABS Take by mouth      QUERCETIN PO Take 750 mg by mouth      Magnesium 500 MG CAPS Take by mouth      vitamin C (ASCORBIC ACID) 500 MG tablet Take 500 mg by mouth daily      Vitamin D-Vitamin K (K2 PLUS D3 PO) Take 5,000 Units by mouth      zinc 50 MG CAPS Take by mouth every other day      VITAMIN K PO Take 550 mcg by mouth daily      selenium 50 MCG tablet Take 50 mcg by mouth daily      Krill Oil 1000 MG CAPS Take by mouth       No current facility-administered medications on file prior to visit.       Past Medical History:   Diagnosis Date    Pelvic mass      Past Surgical History:   Procedure Laterality Date    OTHER SURGICAL HISTORY  04/23/2012 SINGLE SITE LAPAROSCOPIC MYOMECTOMY      UTERINE FIBROID SURGERY  2012     Family History   Problem Relation Age of Onset    Cancer Mother         renal cell ca, (d) in 80 47     Diabetes Father     Heart Failure Father     Hypertension Father     High Cholesterol Father     Prostate Cancer Father     Kidney Disease Father     Heart Attack Maternal Grandmother     Heart Disease Maternal Grandfather     Heart Disease Paternal Grandmother     Diabetes Paternal Grandmother     Heart Attack Paternal Grandfather     Heart Disease Paternal Grandfather      Social History     Socioeconomic History    Marital status:      Spouse name: Not on file    Number of children: Not on file    Years of education: Not on file    Highest education level: Not on file   Occupational History    Not on file   Tobacco Use    Smoking status: Never Smoker    Smokeless tobacco: Never Used   Vaping Use    Vaping Use: Never used   Substance and Sexual Activity    Alcohol use: Yes     Alcohol/week: 1.0 standard drink     Types: 1 Glasses of wine per week    Drug use: Never    Sexual activity: Not on file   Other Topics Concern    Not on file   Social History Narrative    ** Merged History Encounter **          Social Determinants of Health     Financial Resource Strain:     Difficulty of Paying Living Expenses: Not on file   Food Insecurity:     Worried About Running Out of Food in the Last Year: Not on file    Asuncion of Food in the Last Year: Not on file   Transportation Needs:     Lack of Transportation (Medical): Not on file    Lack of Transportation (Non-Medical):  Not on file   Physical Activity:     Days of Exercise per Week: Not on file    Minutes of Exercise per Session: Not on file   Stress:     Feeling of Stress : Not on file   Social Connections:     Frequency of Communication with Friends and Family: Not on file    Frequency of Social Gatherings with Friends and Family: Not on file   Megan Taylor Attends Congregational Services: Not on file    Active Member of Clubs or Organizations: Not on file    Attends Club or Organization Meetings: Not on file    Marital Status: Not on file   Intimate Partner Violence:     Fear of Current or Ex-Partner: Not on file    Emotionally Abused: Not on file    Physically Abused: Not on file    Sexually Abused: Not on file   Housing Stability:     Unable to Pay for Housing in the Last Year: Not on file    Number of [place] in the Last Year: Not on file    Unstable Housing in the Last Year: Not on file       Review of Systems   Constitutional: Negative for appetite change, chills, fever and unexpected weight change. HENT: Negative for congestion, dental problem, postnasal drip and rhinorrhea. Regular dental exams   Eyes: Negative for visual disturbance. Regular eye exams   Respiratory: Negative for cough, chest tightness, shortness of breath and wheezing. Cardiovascular: Negative for chest pain, palpitations and leg swelling. Gastrointestinal: Negative for abdominal pain, constipation, diarrhea, nausea and vomiting. Endocrine: Negative for cold intolerance, heat intolerance, polydipsia, polyphagia and polyuria. Genitourinary: Negative for dysuria, frequency, menstrual problem and urgency. Followed by GYN   Musculoskeletal: Negative for arthralgias, back pain and myalgias. Skin: Negative for color change and rash. Allergic/Immunologic: Negative for environmental allergies. Neurological: Negative for headaches. Hematological: Does not bruise/bleed easily. Psychiatric/Behavioral: Negative for dysphoric mood and sleep disturbance. The patient is not nervous/anxious. OBJECTIVE:    Physical Exam  Vitals and nursing note reviewed. Constitutional:       General: She is not in acute distress. Appearance: Normal appearance. She is well-developed and normal weight. She is not ill-appearing, toxic-appearing or diaphoretic. HENT:      Head: Normocephalic and atraumatic. Right Ear: Tympanic membrane, ear canal and external ear normal. There is no impacted cerumen. Left Ear: Tympanic membrane, ear canal and external ear normal. There is no impacted cerumen. Nose: Nose normal. No congestion or rhinorrhea. Mouth/Throat:      Mouth: Mucous membranes are moist.      Pharynx: Oropharynx is clear. No oropharyngeal exudate or posterior oropharyngeal erythema. Eyes:      Extraocular Movements: Extraocular movements intact. Conjunctiva/sclera: Conjunctivae normal.      Pupils: Pupils are equal, round, and reactive to light. Neck:      Vascular: No carotid bruit. Trachea: No tracheal deviation. Cardiovascular:      Rate and Rhythm: Normal rate and regular rhythm. Heart sounds: Normal heart sounds. Pulmonary:      Effort: Pulmonary effort is normal. No respiratory distress. Breath sounds: Normal breath sounds. No wheezing or rales. Chest:      Chest wall: No tenderness. Abdominal:      General: Bowel sounds are normal. There is no distension. Palpations: Abdomen is soft. There is no mass. Tenderness: There is no abdominal tenderness. Hernia: No hernia is present. Genitourinary:     Comments: Followed by GYN  Musculoskeletal:         General: Normal range of motion. Cervical back: Normal range of motion and neck supple. Right lower leg: No edema. Left lower leg: No edema. Skin:     General: Skin is warm and dry. Capillary Refill: Capillary refill takes less than 2 seconds. Findings: No rash. Neurological:      Mental Status: She is alert and oriented to person, place, and time.    Psychiatric:         Mood and Affect: Mood normal.         Behavior: Behavior normal.       /82   Pulse 91   Temp 97.3 °F (36.3 °C)   Wt 163 lb (73.9 kg)   LMP 03/03/2022   SpO2 99%   BMI 26.31 kg/m²    BP Readings from Last 3 Encounters:   04/19/22 134/82 03/28/22 (!) 138/90   03/24/22 (!) 144/90      Wt Readings from Last 3 Encounters:   04/19/22 163 lb (73.9 kg)   03/28/22 156 lb 1.4 oz (70.8 kg)   03/24/22 162 lb (73.5 kg)       ASSESSMENT & PLAN:    1. Annual physical exam  - POCT Urinalysis no Micro  - Labs reviewed from 3/24/22    2. Colon cancer screening  - Brandon Cm MD, Gastroenterology, [facility]      Continue current treatment plan. Current Outpatient Medications   Medication Sig Dispense Refill    LO LOESTRIN FE 1 MG-10 MCG / 10 MCG tablet TAKE 1 TABLET BY MOUTH DAILY. TAKE PILLS IN THE ORDER DIRECTED ON BLISTER PACK.  Cyanocobalamin (B-12) 1000 MCG TABS Take by mouth      QUERCETIN PO Take 750 mg by mouth      Magnesium 500 MG CAPS Take by mouth      vitamin C (ASCORBIC ACID) 500 MG tablet Take 500 mg by mouth daily      Vitamin D-Vitamin K (K2 PLUS D3 PO) Take 5,000 Units by mouth      zinc 50 MG CAPS Take by mouth every other day      VITAMIN K PO Take 550 mcg by mouth daily      selenium 50 MCG tablet Take 50 mcg by mouth daily      Krill Oil 1000 MG CAPS Take by mouth       No current facility-administered medications for this visit. Return in about 1 year (around 4/19/2023), or if symptoms worsen or fail to improve, for annual physical, screening. [facility] received counseling on the following healthy behaviors: nutrition, exercise and medication adherence    Patient given educational materials on health maintenance     Discussed use, benefit, and side effects of prescribed medications. Barriers to medication compliance addressed. All patient questions answered. Pt voiced understanding. Call office if experience side effects from medications. Please note that some or all of this record was generated using voice recognition software. If there are any questions about the content of this document, please contact the author as some errors in transcription may have occurred.

## 2022-05-17 DIAGNOSIS — Z12.11 COLON CANCER SCREENING: Primary | ICD-10-CM

## 2022-06-13 LAB — NONINV COLON CA DNA+OCC BLD SCRN STL QL: NEGATIVE

## 2022-08-09 ENCOUNTER — PATIENT MESSAGE (OUTPATIENT)
Dept: FAMILY MEDICINE CLINIC | Age: 47
End: 2022-08-09

## 2022-08-10 NOTE — TELEPHONE ENCOUNTER
From: Renee Edwards  To: Nancy Colmenares  Sent: 8/9/2022 4:28 PM EDT  Subject: Medical Exemption Letter    Fabiano Medina,  I hope you are doing well and have enjoyed your summer. When I saw you in the office you mentioned writing a medical exemption letter for me. I would appreciate it and I am happy to stop in the office and  or you can email it to me. Thanks again,  Gila Kern   [phone]  [email or web address]

## 2022-08-23 NOTE — TELEPHONE ENCOUNTER
Please draft a letter: To whom it may concern,     Please exempt Imani Hinkle from COVID-19 vaccination due to thrombocytopenia from COVID-19 virus. Please do not hesitate to call the office if you have questions or concerns. Thank you for your time and consideration regarding this matter. Sincerely,    Alejandrina KENNEDY, MICHELLE        [facility] [facility] [address], [address]  Phone: [phone]  Fax: [phone]

## 2022-10-18 ENCOUNTER — HOSPITAL ENCOUNTER (OUTPATIENT)
Dept: MAMMOGRAPHY | Age: 47
Discharge: HOME | End: 2022-10-18
Payer: COMMERCIAL

## 2022-10-18 VITALS — WEIGHT: 155 LBS | BODY MASS INDEX: 25.02 KG/M2

## 2022-10-18 DIAGNOSIS — Z12.31 VISIT FOR SCREENING MAMMOGRAM: ICD-10-CM

## 2022-10-18 PROCEDURE — 77063 BREAST TOMOSYNTHESIS BI: CPT

## 2023-04-19 ASSESSMENT — ENCOUNTER SYMPTOMS
DIARRHEA: 0
CONSTIPATION: 0
COUGH: 0
WHEEZING: 0
COLOR CHANGE: 0
NAUSEA: 0
BACK PAIN: 0
RHINORRHEA: 0
CHEST TIGHTNESS: 0
ABDOMINAL PAIN: 0
SHORTNESS OF BREATH: 0
VOMITING: 0

## 2023-04-20 ENCOUNTER — OFFICE VISIT (OUTPATIENT)
Dept: FAMILY MEDICINE CLINIC | Age: 48
End: 2023-04-20
Payer: COMMERCIAL

## 2023-04-20 VITALS
DIASTOLIC BLOOD PRESSURE: 70 MMHG | OXYGEN SATURATION: 97 % | HEART RATE: 88 BPM | BODY MASS INDEX: 23.63 KG/M2 | HEIGHT: 66 IN | SYSTOLIC BLOOD PRESSURE: 122 MMHG | WEIGHT: 147 LBS

## 2023-04-20 DIAGNOSIS — Z00.00 ANNUAL PHYSICAL EXAM: Primary | ICD-10-CM

## 2023-04-20 DIAGNOSIS — Z13.29 SCREENING FOR THYROID DISORDER: ICD-10-CM

## 2023-04-20 DIAGNOSIS — Z13.220 SCREENING FOR CHOLESTEROL LEVEL: ICD-10-CM

## 2023-04-20 PROCEDURE — 99396 PREV VISIT EST AGE 40-64: CPT | Performed by: CLINICAL NURSE SPECIALIST

## 2023-04-20 RX ORDER — CLOBETASOL PROPIONATE 0.5 MG/G
OINTMENT TOPICAL
COMMUNITY
Start: 2023-02-22

## 2023-04-20 RX ORDER — PANTOPRAZOLE SODIUM 40 MG/1
TABLET, DELAYED RELEASE ORAL
COMMUNITY
Start: 2022-05-06

## 2023-04-20 RX ORDER — EPINEPHRINE 1 MG/ML
0.3 INJECTION, SOLUTION, CONCENTRATE INTRAVENOUS
COMMUNITY
Start: 2022-05-05

## 2023-04-20 RX ORDER — PREDNISONE 10 MG/1
7 TABLET ORAL
COMMUNITY
Start: 2022-05-06

## 2023-04-20 RX ORDER — FAMOTIDINE 10 MG/ML
20 INJECTION, SOLUTION INTRAVENOUS
COMMUNITY
Start: 2022-05-05

## 2023-04-20 ASSESSMENT — PATIENT HEALTH QUESTIONNAIRE - PHQ9
SUM OF ALL RESPONSES TO PHQ9 QUESTIONS 1 & 2: 0
1. LITTLE INTEREST OR PLEASURE IN DOING THINGS: 0
SUM OF ALL RESPONSES TO PHQ QUESTIONS 1-9: 0
2. FEELING DOWN, DEPRESSED OR HOPELESS: 0

## 2023-04-21 LAB
ALBUMIN SERPL-MCNC: 5.2 G/DL (ref 3.4–5)
ALBUMIN/GLOB SERPL: 2.5 {RATIO} (ref 1.1–2.2)
ALP SERPL-CCNC: 114 U/L (ref 40–129)
ALT SERPL-CCNC: 15 U/L (ref 10–40)
ANION GAP SERPL CALCULATED.3IONS-SCNC: 14 MMOL/L (ref 3–16)
AST SERPL-CCNC: 20 U/L (ref 15–37)
BASOPHILS # BLD: 0 K/UL (ref 0–0.2)
BASOPHILS NFR BLD: 0 %
BILIRUB SERPL-MCNC: 0.7 MG/DL (ref 0–1)
BUN SERPL-MCNC: 9 MG/DL (ref 7–20)
CALCIUM SERPL-MCNC: 10.5 MG/DL (ref 8.3–10.6)
CHLORIDE SERPL-SCNC: 104 MMOL/L (ref 99–110)
CHOLEST SERPL-MCNC: 228 MG/DL (ref 0–199)
CO2 SERPL-SCNC: 27 MMOL/L (ref 21–32)
CREAT SERPL-MCNC: 0.6 MG/DL (ref 0.6–1.1)
DEPRECATED RDW RBC AUTO: 13.8 % (ref 12.4–15.4)
EOSINOPHIL # BLD: 0.2 K/UL (ref 0–0.6)
EOSINOPHIL NFR BLD: 3 %
GFR SERPLBLD CREATININE-BSD FMLA CKD-EPI: >60 ML/MIN/{1.73_M2}
GLUCOSE SERPL-MCNC: 78 MG/DL (ref 70–99)
HCT VFR BLD AUTO: 37.4 % (ref 36–48)
HDLC SERPL-MCNC: 125 MG/DL (ref 40–60)
HGB BLD-MCNC: 13 G/DL (ref 12–16)
LDLC SERPL CALC-MCNC: 94 MG/DL
LYMPHOCYTES # BLD: 1.9 K/UL (ref 1–5.1)
LYMPHOCYTES NFR BLD: 29 %
MCH RBC QN AUTO: 30.1 PG (ref 26–34)
MCHC RBC AUTO-ENTMCNC: 34.7 G/DL (ref 31–36)
MCV RBC AUTO: 86.7 FL (ref 80–100)
MONOCYTES # BLD: 0.3 K/UL (ref 0–1.3)
MONOCYTES NFR BLD: 5 %
NEUTROPHILS # BLD: 4.1 K/UL (ref 1.7–7.7)
NEUTROPHILS NFR BLD: 63 %
PLATELET # BLD AUTO: 208 K/UL (ref 135–450)
PMV BLD AUTO: 10.1 FL (ref 5–10.5)
POTASSIUM SERPL-SCNC: 4.1 MMOL/L (ref 3.5–5.1)
PROT SERPL-MCNC: 7.3 G/DL (ref 6.4–8.2)
RBC # BLD AUTO: 4.31 M/UL (ref 4–5.2)
RBC MORPH BLD: NORMAL
SODIUM SERPL-SCNC: 145 MMOL/L (ref 136–145)
TRIGL SERPL-MCNC: 43 MG/DL (ref 0–150)
TSH SERPL DL<=0.005 MIU/L-ACNC: 0.68 UIU/ML (ref 0.27–4.2)
VLDLC SERPL CALC-MCNC: 9 MG/DL
WBC # BLD AUTO: 6.5 K/UL (ref 4–11)

## 2023-09-17 NOTE — TELEPHONE ENCOUNTER
Discharge Instructions    Discharged to home by car with self. Discharged via wheelchair, hospital escort: Yes.  Special equipment needed: Not Applicable    Be sure to schedule a follow-up appointment with your primary care doctor or any specialists as instructed.     Discharge Plan:   Diet Plan: Discussed  Activity Level: Discussed  Confirmed Follow up Appointment: Patient to Call and Schedule Appointment  Confirmed Symptoms Management: Discussed  Medication Reconciliation Updated: Yes    I understand that a diet low in cholesterol, fat, and sodium is recommended for good health. Unless I have been given specific instructions below for another diet, I accept this instruction as my diet prescription.   Other diet: Heart healthy     Special Instructions: None    -Is this patient being discharged with medication to prevent blood clots?  No    Is patient discharged on Warfarin / Coumadin?   No      Please contact patient and let her know that she will need to have labs redrawn.   Thanks

## 2023-10-24 ENCOUNTER — HOSPITAL ENCOUNTER (OUTPATIENT)
Dept: MAMMOGRAPHY | Age: 48
Discharge: HOME | End: 2023-10-24
Payer: COMMERCIAL

## 2023-10-24 VITALS — WEIGHT: 145 LBS | BODY MASS INDEX: 23.3 KG/M2 | HEIGHT: 66 IN

## 2023-10-24 DIAGNOSIS — Z12.31 VISIT FOR SCREENING MAMMOGRAM: ICD-10-CM

## 2023-10-24 PROCEDURE — 77063 BREAST TOMOSYNTHESIS BI: CPT

## 2024-10-30 ENCOUNTER — HOSPITAL ENCOUNTER (OUTPATIENT)
Dept: MAMMOGRAPHY | Age: 49
Discharge: HOME | End: 2024-10-30
Payer: COMMERCIAL

## 2024-10-30 VITALS — BODY MASS INDEX: 23.3 KG/M2 | HEIGHT: 66 IN | WEIGHT: 145 LBS

## 2024-10-30 DIAGNOSIS — Z12.31 VISIT FOR SCREENING MAMMOGRAM: ICD-10-CM

## 2024-10-30 PROCEDURE — 77063 BREAST TOMOSYNTHESIS BI: CPT
